# Patient Record
Sex: FEMALE | Race: BLACK OR AFRICAN AMERICAN | NOT HISPANIC OR LATINO | Employment: OTHER | URBAN - METROPOLITAN AREA
[De-identification: names, ages, dates, MRNs, and addresses within clinical notes are randomized per-mention and may not be internally consistent; named-entity substitution may affect disease eponyms.]

---

## 2023-08-29 LAB
ALANINE AMINOTRANSFERASE (SGPT) (U/L) IN SER/PLAS: 9 U/L (ref 7–45)
ALBUMIN (G/DL) IN SER/PLAS: 4.2 G/DL (ref 3.4–5)
ALKALINE PHOSPHATASE (U/L) IN SER/PLAS: 40 U/L (ref 33–136)
ANION GAP IN SER/PLAS: 14 MMOL/L (ref 10–20)
ASPARTATE AMINOTRANSFERASE (SGOT) (U/L) IN SER/PLAS: 17 U/L (ref 9–39)
BILIRUBIN TOTAL (MG/DL) IN SER/PLAS: 0.8 MG/DL (ref 0–1.2)
CALCIUM (MG/DL) IN SER/PLAS: 10.8 MG/DL (ref 8.6–10.6)
CARBON DIOXIDE, TOTAL (MMOL/L) IN SER/PLAS: 30 MMOL/L (ref 21–32)
CHLORIDE (MMOL/L) IN SER/PLAS: 89 MMOL/L (ref 98–107)
CREATININE (MG/DL) IN SER/PLAS: 1.11 MG/DL (ref 0.5–1.05)
ERYTHROCYTE DISTRIBUTION WIDTH (RATIO) BY AUTOMATED COUNT: 14.6 % (ref 11.5–14.5)
ERYTHROCYTE MEAN CORPUSCULAR HEMOGLOBIN CONCENTRATION (G/DL) BY AUTOMATED: 34.4 G/DL (ref 32–36)
ERYTHROCYTE MEAN CORPUSCULAR VOLUME (FL) BY AUTOMATED COUNT: 92 FL (ref 80–100)
ERYTHROCYTES (10*6/UL) IN BLOOD BY AUTOMATED COUNT: 4.18 X10E12/L (ref 4–5.2)
GFR FEMALE: 48 ML/MIN/1.73M2
GLUCOSE (MG/DL) IN SER/PLAS: 118 MG/DL (ref 74–99)
HEMATOCRIT (%) IN BLOOD BY AUTOMATED COUNT: 38.4 % (ref 36–46)
HEMOGLOBIN (G/DL) IN BLOOD: 13.2 G/DL (ref 12–16)
LEUKOCYTES (10*3/UL) IN BLOOD BY AUTOMATED COUNT: 2.8 X10E9/L (ref 4.4–11.3)
NRBC (PER 100 WBCS) BY AUTOMATED COUNT: 0 /100 WBC (ref 0–0)
PLATELETS (10*3/UL) IN BLOOD AUTOMATED COUNT: 256 X10E9/L (ref 150–450)
POTASSIUM (MMOL/L) IN SER/PLAS: 3.2 MMOL/L (ref 3.5–5.3)
PROTEIN TOTAL: 7.5 G/DL (ref 6.4–8.2)
SODIUM (MMOL/L) IN SER/PLAS: 130 MMOL/L (ref 136–145)
THYROTROPIN (MIU/L) IN SER/PLAS BY DETECTION LIMIT <= 0.05 MIU/L: 93.46 MIU/L (ref 0.44–3.98)
THYROXINE (T4) FREE (NG/DL) IN SER/PLAS: 0.87 NG/DL (ref 0.78–1.48)
UREA NITROGEN (MG/DL) IN SER/PLAS: 11 MG/DL (ref 6–23)

## 2023-09-17 PROBLEM — M89.319 CLAVICULAR ENLARGEMENT: Status: ACTIVE | Noted: 2023-09-17

## 2023-09-17 PROBLEM — E63.9 IMPAIRED NUTRITION: Status: ACTIVE | Noted: 2023-09-17

## 2023-09-17 PROBLEM — N81.10 FEMALE BLADDER PROLAPSE: Status: ACTIVE | Noted: 2023-09-17

## 2023-09-17 PROBLEM — E03.9 HYPOTHYROIDISM: Status: ACTIVE | Noted: 2023-09-17

## 2023-09-17 PROBLEM — H52.00 HYPEROPIA WITH ASTIGMATISM AND PRESBYOPIA: Status: ACTIVE | Noted: 2023-09-17

## 2023-09-17 PROBLEM — H52.209 HYPEROPIA WITH ASTIGMATISM AND PRESBYOPIA: Status: ACTIVE | Noted: 2023-09-17

## 2023-09-17 PROBLEM — H30.20 PARS PLANITIS: Status: ACTIVE | Noted: 2023-09-17

## 2023-09-17 PROBLEM — H25.13 CATARACT, NUCLEAR SCLEROTIC, BOTH EYES: Status: ACTIVE | Noted: 2023-09-17

## 2023-09-17 PROBLEM — F32.A DEPRESSION: Status: ACTIVE | Noted: 2023-09-17

## 2023-09-17 PROBLEM — R01.1 HEART MURMUR: Status: ACTIVE | Noted: 2023-09-17

## 2023-09-17 PROBLEM — I10 HYPERTENSION: Status: ACTIVE | Noted: 2023-09-17

## 2023-09-17 PROBLEM — H35.063 RETINAL VASCULITIS, BILATERAL: Status: ACTIVE | Noted: 2023-09-17

## 2023-09-17 PROBLEM — F32.5 MAJOR DEPRESSIVE DISORDER WITH SINGLE EPISODE, IN FULL REMISSION (CMS-HCC): Status: ACTIVE | Noted: 2023-09-17

## 2023-09-17 PROBLEM — R35.0 FREQUENT URINATION: Status: ACTIVE | Noted: 2023-09-17

## 2023-09-17 PROBLEM — R79.9 ABNORMAL BLOOD CHEMISTRY: Status: ACTIVE | Noted: 2023-09-17

## 2023-09-17 PROBLEM — H52.4 HYPEROPIA WITH ASTIGMATISM AND PRESBYOPIA: Status: ACTIVE | Noted: 2023-09-17

## 2023-09-17 PROBLEM — H20.9 ANTERIOR UVEITIS: Status: ACTIVE | Noted: 2023-09-17

## 2023-09-17 PROBLEM — R63.4 WEIGHT LOSS: Status: ACTIVE | Noted: 2023-09-17

## 2023-09-17 PROBLEM — R79.89 LOW VITAMIN D LEVEL: Status: ACTIVE | Noted: 2023-09-17

## 2023-09-17 RX ORDER — HYDROCHLOROTHIAZIDE 25 MG/1
1 TABLET ORAL DAILY
COMMUNITY
Start: 2013-04-03 | End: 2023-10-18 | Stop reason: ALTCHOICE

## 2023-09-17 RX ORDER — VIT C/E/ZN/COPPR/LUTEIN/ZEAXAN 250MG-90MG
1 CAPSULE ORAL DAILY
COMMUNITY
Start: 2017-10-03 | End: 2023-10-18 | Stop reason: ALTCHOICE

## 2023-09-17 RX ORDER — PREDNISOLONE ACETATE 10 MG/ML
1 SUSPENSION/ DROPS OPHTHALMIC SEE ADMIN INSTRUCTIONS
COMMUNITY
Start: 2021-06-15

## 2023-09-17 RX ORDER — LEVOTHYROXINE SODIUM 75 UG/1
1 TABLET ORAL DAILY
COMMUNITY
Start: 2013-05-20 | End: 2023-10-20 | Stop reason: ALTCHOICE

## 2023-09-17 RX ORDER — LEVOTHYROXINE SODIUM 50 UG/1
1 CAPSULE ORAL DAILY
COMMUNITY
End: 2023-10-18

## 2023-09-17 RX ORDER — MIRTAZAPINE 15 MG/1
1 TABLET, FILM COATED ORAL NIGHTLY
COMMUNITY
Start: 2013-04-02 | End: 2023-10-18 | Stop reason: ALTCHOICE

## 2023-09-17 RX ORDER — CLONAZEPAM 0.5 MG/1
1 TABLET ORAL NIGHTLY
COMMUNITY
Start: 2016-05-05 | End: 2023-11-07 | Stop reason: SDUPTHER

## 2023-10-18 ENCOUNTER — APPOINTMENT (OUTPATIENT)
Dept: OPHTHALMOLOGY | Facility: CLINIC | Age: 88
End: 2023-10-18
Payer: MEDICAID

## 2023-10-18 ENCOUNTER — OFFICE VISIT (OUTPATIENT)
Dept: PRIMARY CARE | Facility: CLINIC | Age: 88
End: 2023-10-18
Payer: MEDICARE

## 2023-10-18 VITALS
HEART RATE: 81 BPM | RESPIRATION RATE: 16 BRPM | WEIGHT: 98 LBS | BODY MASS INDEX: 17.36 KG/M2 | SYSTOLIC BLOOD PRESSURE: 129 MMHG | HEIGHT: 63 IN | DIASTOLIC BLOOD PRESSURE: 63 MMHG | TEMPERATURE: 97.6 F | OXYGEN SATURATION: 99 %

## 2023-10-18 DIAGNOSIS — I10 PRIMARY HYPERTENSION: Primary | ICD-10-CM

## 2023-10-18 DIAGNOSIS — E03.9 HYPOTHYROIDISM, UNSPECIFIED TYPE: ICD-10-CM

## 2023-10-18 DIAGNOSIS — F34.1 PERSISTENT DEPRESSIVE DISORDER: ICD-10-CM

## 2023-10-18 DIAGNOSIS — R63.4 WEIGHT LOSS: ICD-10-CM

## 2023-10-18 LAB
ANION GAP SERPL CALC-SCNC: 14 MMOL/L (ref 10–20)
BUN SERPL-MCNC: 9 MG/DL (ref 6–23)
CALCIUM SERPL-MCNC: 10.3 MG/DL (ref 8.6–10.6)
CHLORIDE SERPL-SCNC: 95 MMOL/L (ref 98–107)
CO2 SERPL-SCNC: 31 MMOL/L (ref 21–32)
CREAT SERPL-MCNC: 0.89 MG/DL (ref 0.5–1.05)
GFR SERPL CREATININE-BSD FRML MDRD: 63 ML/MIN/1.73M*2
GLUCOSE SERPL-MCNC: 95 MG/DL (ref 74–99)
POTASSIUM SERPL-SCNC: 3.7 MMOL/L (ref 3.5–5.3)
SODIUM SERPL-SCNC: 136 MMOL/L (ref 136–145)
T4 FREE SERPL-MCNC: 0.86 NG/DL (ref 0.78–1.48)
TSH SERPL-ACNC: 26.18 MIU/L (ref 0.44–3.98)

## 2023-10-18 PROCEDURE — 1126F AMNT PAIN NOTED NONE PRSNT: CPT | Performed by: INTERNAL MEDICINE

## 2023-10-18 PROCEDURE — 3078F DIAST BP <80 MM HG: CPT | Performed by: INTERNAL MEDICINE

## 2023-10-18 PROCEDURE — 3074F SYST BP LT 130 MM HG: CPT | Performed by: INTERNAL MEDICINE

## 2023-10-18 PROCEDURE — 36415 COLL VENOUS BLD VENIPUNCTURE: CPT | Mod: PO | Performed by: INTERNAL MEDICINE

## 2023-10-18 PROCEDURE — 99215 OFFICE O/P EST HI 40 MIN: CPT | Performed by: INTERNAL MEDICINE

## 2023-10-18 PROCEDURE — 84443 ASSAY THYROID STIM HORMONE: CPT | Performed by: INTERNAL MEDICINE

## 2023-10-18 PROCEDURE — 99215 OFFICE O/P EST HI 40 MIN: CPT | Mod: PO | Performed by: INTERNAL MEDICINE

## 2023-10-18 PROCEDURE — 80048 BASIC METABOLIC PNL TOTAL CA: CPT | Performed by: INTERNAL MEDICINE

## 2023-10-18 PROCEDURE — 84439 ASSAY OF FREE THYROXINE: CPT | Performed by: INTERNAL MEDICINE

## 2023-10-18 RX ORDER — MIRTAZAPINE 30 MG/1
30 TABLET, FILM COATED ORAL NIGHTLY
Qty: 90 TABLET | Refills: 1 | Status: SHIPPED | OUTPATIENT
Start: 2023-10-18 | End: 2023-11-07 | Stop reason: SDUPTHER

## 2023-10-18 ASSESSMENT — ENCOUNTER SYMPTOMS
FEVER: 0
CHILLS: 0
SHORTNESS OF BREATH: 0
VOMITING: 0
ABDOMINAL PAIN: 0
NAUSEA: 0

## 2023-10-18 ASSESSMENT — PAIN SCALES - GENERAL: PAINLEVEL: 0-NO PAIN

## 2023-10-18 NOTE — PROGRESS NOTES
"Subjective   Patient ID: Shira Curry is a 88 y.o. female who presents for Annual Exam.    Presents for complete physical exam. Was seeing a doctor at [facility] and subsequently had  1. Hydrochlorothiazide discontinued  2. Started on potassium (not sure for how long)  3. Dose of Remeron increased from 15 to 30  4. Dose of Synthroid increased from 75 to 100  Pt however, continued with the hydrochlorothiazide and is not sure how much of the increased Synthroid she took but went back to her original dose. Weight is up and she reports she is eating better.          Review of Systems   Constitutional:  Negative for chills and fever.   Respiratory:  Negative for shortness of breath.    Cardiovascular:  Negative for chest pain.   Gastrointestinal:  Negative for abdominal pain, nausea and vomiting.       Objective   /63 (BP Location: Left arm, Patient Position: Sitting, BP Cuff Size: Adult)   Pulse 81   Temp 36.4 °C (97.6 °F) (Temporal)   Resp 16   Ht 1.6 m (5' 3\")   Wt (!) 44.5 kg (98 lb)   SpO2 99%   BMI 17.36 kg/m²     Physical Exam  Gen:well groomed in NAD  A & O: alert and oriented x 3  HEENT: PEERL, EOMI, TM's clear bilaterally, no carotid bruits, no palpable thyroid nodules  CV: RRR  Lungs: CTA bilaterally  Abd: soft, NT/ND  Extr: no edema  Breasts: no palpable nodules or nipple discharge  Neuro: CN 2-12 intact, motor 5/5, sensation intact  Pelvic: deferred to Gyn   Assessment/Plan   Hypothyroidism: recheck labs.  High blood pressure: will discontinue med and the potassium if she is still taking it. Recheck labs.  Declines flu shot  Return to office 6 months or sooner depending on lab results. Will call daughter with instructions.        "

## 2023-10-20 DIAGNOSIS — E03.9 HYPOTHYROIDISM, UNSPECIFIED TYPE: Primary | ICD-10-CM

## 2023-10-20 RX ORDER — LEVOTHYROXINE SODIUM 100 UG/1
100 TABLET ORAL DAILY
Qty: 90 TABLET | Refills: 1 | Status: SHIPPED | OUTPATIENT
Start: 2023-10-20 | End: 2024-10-19

## 2023-10-20 NOTE — PROGRESS NOTES
Spoke with the patients daughter. Thyroid med should now be 100mg daily. Daughter will  the script. Needs repeat blood draw in one month

## 2023-11-07 ENCOUNTER — OFFICE VISIT (OUTPATIENT)
Dept: BEHAVIORAL HEALTH | Facility: CLINIC | Age: 88
End: 2023-11-07
Payer: MEDICAID

## 2023-11-07 VITALS
WEIGHT: 98.1 LBS | BODY MASS INDEX: 15.4 KG/M2 | HEIGHT: 67 IN | SYSTOLIC BLOOD PRESSURE: 166 MMHG | DIASTOLIC BLOOD PRESSURE: 94 MMHG | TEMPERATURE: 97.4 F | HEART RATE: 83 BPM

## 2023-11-07 DIAGNOSIS — F34.1 PERSISTENT DEPRESSIVE DISORDER: ICD-10-CM

## 2023-11-07 PROCEDURE — 1126F AMNT PAIN NOTED NONE PRSNT: CPT | Performed by: PSYCHIATRY & NEUROLOGY

## 2023-11-07 PROCEDURE — 3077F SYST BP >= 140 MM HG: CPT | Performed by: PSYCHIATRY & NEUROLOGY

## 2023-11-07 PROCEDURE — 1036F TOBACCO NON-USER: CPT | Performed by: PSYCHIATRY & NEUROLOGY

## 2023-11-07 PROCEDURE — 99214 OFFICE O/P EST MOD 30 MIN: CPT | Performed by: PSYCHIATRY & NEUROLOGY

## 2023-11-07 PROCEDURE — 3080F DIAST BP >= 90 MM HG: CPT | Performed by: PSYCHIATRY & NEUROLOGY

## 2023-11-07 RX ORDER — CLONAZEPAM 0.5 MG/1
0.5 TABLET ORAL NIGHTLY
Qty: 30 TABLET | Refills: 2 | Status: SHIPPED | OUTPATIENT
Start: 2023-11-07 | End: 2024-02-06 | Stop reason: SDUPTHER

## 2023-11-07 RX ORDER — MIRTAZAPINE 30 MG/1
30 TABLET, FILM COATED ORAL NIGHTLY
Qty: 30 TABLET | Refills: 3 | Status: SHIPPED | OUTPATIENT
Start: 2023-11-07 | End: 2024-02-06 | Stop reason: SDUPTHER

## 2023-11-07 NOTE — PATIENT INSTRUCTIONS
Plan:   - Continue mirtazapine 30mg at bedtime.   - Continue clonazepam 0.5mg at bedtime. - Make sure that you don't get this prescription from anyone else going forward.   - Recommend using a pillbox for your medications.   - Please talk to your primary care doctor about your high blood pressure today.   - Healthy lifestyle encouraged - regular physical and mental exercise; stay socially engaged; maintain good sleep hygiene; eat heart-healthy diet.   - Follow up in about 3 months.   - Call sooner in case of any questions.

## 2023-11-07 NOTE — PROGRESS NOTES
"Outpatient Psychiatry      Reason for Visit:   Follow up for depression    Subjective   Ms. Shira Curry is an 88-year old woman with a history of depression, hypothyroidism, HTN, IBS. Follow up done in person. Daughter was present for part of the visit.      She says she feels \"pretty good most of the time.\"   She says she sometimes feels like she does not want to do anything.   She says she sleeps well but has to take mirtazapine and clonazepam at bedtime.      She saw a doctor at [facility] and mirtazapine dose was increased to 30mg at bedtime. However, patient and her daughter note that she will no longer be seeing the providers through [facility] because she did not like the setup. Daughter notes that she is doing well on the current dose, while she was somewhat sluggish before.     She says she sometimes goes to activities downstairs in her building. She says the activities are not as much as before due to COVID.      Appetite is ok; weight has been stable. She says her diet is limited because of IBS.   No death wishes, suicidal thoughts, intent or plans.   No HI.   Denies AVH, paranoia or delusions.      She denies panic attacks.      She says her memory is \"okay.\" She says she sometimes forgets things.   She says she is still independent with ADLs and IADLs.   She does not drive.   She manages her own bills and denies any problems. Her daughter may help.   She does not cook much; denies any issues in the kitchen. Her daughter may bring her food.   She is able to walk to the store.   She does some cleaning in the house.   Takes medications on her own; does not lupillo pillbox.      She lives in a senior building.   Has six daughters; usually in contact with 3 of them; one daughter is out of town in [place] but calls frequently.      Uses a cane to walk.     Current medications:   Mirtazapine 30mg at bedtime  Clonazepam 0.5mg at bedtime was not on the list she provided but she notes that she does take it at night. On " OARRS, it has been prescribed for the last 6 prescriptions by Harleen Canales NP, last filled on 10/18/23.        Current Medications:    Current Outpatient Medications:     clonazePAM (KlonoPIN) 0.5 mg tablet, Take 1 tablet (0.5 mg) by mouth once daily at bedtime., Disp: , Rfl:     francy.stocking,thigh,reg,med misc, 1 Units see administration instructions., Disp: , Rfl:     levothyroxine (Synthroid, Levoxyl) 100 mcg tablet, Take 1 tablet (100 mcg) by mouth once daily., Disp: 90 tablet, Rfl: 1    mirtazapine (Remeron) 30 mg tablet, Take 1 tablet (30 mg) by mouth once daily at bedtime., Disp: 90 tablet, Rfl: 1    prednisoLONE acetate (Pred Forte) 1 % ophthalmic suspension, Administer 1 drop into affected eye(s) see administration instructions., Disp: , Rfl:   Medical History:  Past Medical History:   Diagnosis Date    Pain in unspecified hip 06/15/2021    Hip pain     Surgical History:  Past Surgical History:   Procedure Laterality Date    COLONOSCOPY  12/30/2015    Complete Colonoscopy     Family History:  Family History   Problem Relation Name Age of Onset    Hypertension Mother      Breast cancer Mother       Social History:  Social History     Socioeconomic History    Marital status:      Spouse name: Not on file    Number of children: Not on file    Years of education: Not on file    Highest education level: Not on file   Occupational History    Not on file   Tobacco Use    Smoking status: Not on file    Smokeless tobacco: Not on file   Substance and Sexual Activity    Alcohol use: Not on file    Drug use: Not on file    Sexual activity: Not on file   Other Topics Concern    Not on file   Social History Narrative    Not on file     Social Determinants of Health     Financial Resource Strain: Not on file   Food Insecurity: Not on file   Transportation Needs: Not on file   Physical Activity: Not on file   Stress: Not on file   Social Connections: Not on file   Intimate Partner Violence: Not on file  "  Housing Stability: Not on file         Record Review: brief         Psychiatric Review Of Systems:  As above.      Medical Review Of Systems:  Pertinent items are noted in HPI.      Objective   Mental Status Exam:   Appearance: appeared to be stated age; fair hygiene and grooming.   Behavior/Attitude: Cooperative. Pleasant.   Speech: Regular in rate, soft tone, and regular volume. No pressure.  Mood: \"pretty good most of the time\"  Thought process: Goal-directed. Linear. Organized.  Thought content: No paranoia, delusion or ideas of reference elicited. No hallucinations in auditory, visual or other sensory modalities.   Suicidal ideation: denied.  Homicidal ideation: denied.   Insight: Fair.  Judgment: Fair.  Recent and remote memory: fair recall of recent and remote autobiographical memories. Daughter had to help with some details regarding her recent contact with [facility]. Patient knew her medication regimen.   Attention/concentration: intact during visit  Language: No aphasia or paraphasic errors during conversation   Fund of knowledge: Average      Vitals:  Vitals:    11/07/23 1008   BP: (!) 166/94   Pulse: 83   Temp: 36.3 °C (97.4 °F)       Assessment/Plan   Diagnosis:   Major depressive disorder, in full remission    Assessment:     Ms. Shira Curry is a 88-year old woman with a history of depression, hypothyroidism, HTN, IBS. Follow up done in person in office today.      11/7/23: Mood continues to be stable.   Stable cognitively and functionally; has some help from family but otherwise functioning independently.   Her mirtazapine was increased by provider through [facility]. Seems to be tolerating it well.      Treatment Plan/Recommendations:    Plan:   - Continue remeron 30mg at bedtime and clonazepam 0.5mg at bedtime. (She has tried lowering clonazepam but could not tolerate.)  - I have personally reviewed the OARRS report for this patient. The report is scanned into the electronic medical record. I have considered " the risks of abuse, dependence, addiction and diversion. I believe that it is clinically appropriate for the patient to be prescribed this medication.  - She signed benzodiazepine controlled substance agreement e-document. Discussed the details of the agreement, especially not getting it from other prescribers.   - Encouraged healthy lifestyle.   - Follow up in 3 months.       Review with patient: Treatment plan reviewed with the patient.  Medication risks/benefit reviewed with the patient      Elizabeth Guerrero MD

## 2023-11-08 ENCOUNTER — TELEPHONE (OUTPATIENT)
Dept: PRIMARY CARE | Facility: CLINIC | Age: 88
End: 2023-11-08
Payer: MEDICARE

## 2023-11-18 ENCOUNTER — TELEPHONE (OUTPATIENT)
Dept: BEHAVIORAL HEALTH | Facility: CLINIC | Age: 88
End: 2023-11-18
Payer: MEDICARE

## 2023-11-18 NOTE — TELEPHONE ENCOUNTER
"This patient called the psych answering service on 11/18 at 09:18. She reports that she has run out of her clonazepam and mirtazapine. These medications were delivered by her pharmacy \"in a box.\" Last night was her last dose of meds, and today, she can't find any more.     From chart review, this is an 89 yo with a history of persistent depressive disorder who sees Dr. Guerrero. Per his last note on 11/07/2023 the plan was for the patient to continue mirtazapine 30 mg at bedtime and clonazepam 0.5 mg at bedtime. A review of her current orders shows that she has refills on both medications. Per OARRS, there have been no fills for clonazepam since 10/18.     I explain to the patient that she has refills available at her pharmacy and she can call to have her meds refilled. I give her the phone number for the pharmacy. She asks several times what she should do, and we review the plan for her to get refills through her pharmacy. She reads the number back to me three times and agrees to call the psychiatry service if she needs any help. I ask if there is a family member available to help her and she says, \"I'll have to wait and see.\" She goes over the plan with me once more and we end the call.    I try calling her back a few times in the afternoon, to check on her, but her line is busy each time.     I will add Dr. Guerrero to this note.   "

## 2023-12-18 DIAGNOSIS — I10 PRIMARY HYPERTENSION: Primary | ICD-10-CM

## 2023-12-18 RX ORDER — HYDROCHLOROTHIAZIDE 25 MG/1
25 TABLET ORAL DAILY
Qty: 90 TABLET | Refills: 1 | Status: SHIPPED | OUTPATIENT
Start: 2023-12-18

## 2024-01-12 ENCOUNTER — TELEPHONE (OUTPATIENT)
Dept: BEHAVIORAL HEALTH | Facility: CLINIC | Age: 89
End: 2024-01-12
Payer: MEDICARE

## 2024-02-06 ENCOUNTER — OFFICE VISIT (OUTPATIENT)
Dept: BEHAVIORAL HEALTH | Facility: CLINIC | Age: 89
End: 2024-02-06
Payer: MEDICARE

## 2024-02-06 VITALS
SYSTOLIC BLOOD PRESSURE: 163 MMHG | BODY MASS INDEX: 15.91 KG/M2 | TEMPERATURE: 97.2 F | DIASTOLIC BLOOD PRESSURE: 87 MMHG | HEIGHT: 66 IN | WEIGHT: 99 LBS | HEART RATE: 78 BPM

## 2024-02-06 DIAGNOSIS — F34.1 PERSISTENT DEPRESSIVE DISORDER: ICD-10-CM

## 2024-02-06 PROCEDURE — 99213 OFFICE O/P EST LOW 20 MIN: CPT | Performed by: PSYCHIATRY & NEUROLOGY

## 2024-02-06 PROCEDURE — 3079F DIAST BP 80-89 MM HG: CPT | Performed by: PSYCHIATRY & NEUROLOGY

## 2024-02-06 PROCEDURE — 3077F SYST BP >= 140 MM HG: CPT | Performed by: PSYCHIATRY & NEUROLOGY

## 2024-02-06 PROCEDURE — 1036F TOBACCO NON-USER: CPT | Performed by: PSYCHIATRY & NEUROLOGY

## 2024-02-06 PROCEDURE — 1126F AMNT PAIN NOTED NONE PRSNT: CPT | Performed by: PSYCHIATRY & NEUROLOGY

## 2024-02-06 RX ORDER — CLONAZEPAM 0.5 MG/1
0.5 TABLET ORAL NIGHTLY
Qty: 30 TABLET | Refills: 2 | Status: SHIPPED | OUTPATIENT
Start: 2024-02-06 | End: 2024-05-06 | Stop reason: SDUPTHER

## 2024-02-06 RX ORDER — MIRTAZAPINE 30 MG/1
30 TABLET, FILM COATED ORAL NIGHTLY
Qty: 30 TABLET | Refills: 3 | Status: SHIPPED | OUTPATIENT
Start: 2024-02-06 | End: 2024-08-04

## 2024-02-06 NOTE — PATIENT INSTRUCTIONS
Plan:   - Continue remeron 30mg at bedtime.   - Continue clonazepam 0.5mg at bedtime.   - Please follow up with primary care doctor about your blood pressure.   - Encouraged healthy lifestyle.   - Follow up in about 3 months.   - Contact via [employer] or call sooner ([phone]) in case of any questions or concerns.  - Call [phone] for mental health crisis or suicidal thoughts, or call 911 or go to the nearest emergency room for emergencies.    Brain healthy lifestyle:   - Make sure your medical conditions (if any) such as diabetes, high blood pressure, high cholesterol, thyroid disease sleep apnea are optimally controlled.   - Use eyeglasses or hearing aids appropriately if needed.   - Eat a heart healthy diet (like a Mediterranean diet; lots of fruits and vegetables, fish; low fat;)  - Exercise regularly as tolerated.   - Maintain good sleep hygiene; avoid daytime naps; try to get 7 to 8 hours of continuous sleep at night.   - Stay mentally active - puzzles, word searches, books, playing cards.  - Stay socially active and engaged.

## 2024-02-06 NOTE — PROGRESS NOTES
"Outpatient Psychiatry      Reason for Visit:   Follow up for depression    Subjective   Ms. Shira Curry is an 88-year old woman with a history of depression, hypothyroidism, HTN, IBS. Follow up done in person. Daughter was present for part of the visit.      She says she feels \"good.\"     She says she sleeps well; she says she still has to take mirtazapine and clonazepam at bedtime to help her sleep.   Appetite is alright but notes she does not eat a lot; says weight is stable.   She says her diet is limited because of IBS.   No death wishes, suicidal thoughts, intent or plans.   No HI.     Denies AVH, paranoia or delusions.     She denies panic attacks.      She says her memory is \"okay\" but she sometimes forgets things.   She says she is still independent with ADLs and IADLs.   She does not drive.   She manages her own bills and denies any problems. Her daughter may help somewhat.  She does not cook much; denies any issues in the kitchen. Her daughter may bring her food.   She is able to walk to the store.   She does some cleaning in the house.   Takes medications on her own; does not use pillbox.   She says her daughters may help remind her of appointments.     She says she did not take her BP medication today - her BP was slightly high today. Denies any headaches, dizziness, chest pain. She says the paperwork from her primary care doctor at last visit noted she should stop her BP medication but notes that she has continued to take it.      She lives in a senior building.   Has six daughters; usually in contact with most of them; one daughter is out of town in [place] but calls frequently.     She says she sometimes goes to activities in her building. She says the activities are not as much as before due to COVID. She also goes to the store.      Uses a cane to walk.      Current medications:   Mirtazapine 30mg at bedtime  Clonazepam 0.5mg at bedtime.      Current Medications:    Current Outpatient Medications: "     clonazePAM (KlonoPIN) 0.5 mg tablet, Take 1 tablet (0.5 mg) by mouth once daily at bedtime., Disp: 30 tablet, Rfl: 2    francy.stocking,thigh,reg,med misc, 1 Units see administration instructions., Disp: , Rfl:     hydroCHLOROthiazide (HYDRODiuril) 25 mg tablet, TAKE 1 TABLET BY MOUTH EVERY DAY, Disp: 90 tablet, Rfl: 1    levothyroxine (Synthroid, Levoxyl) 100 mcg tablet, Take 1 tablet (100 mcg) by mouth once daily., Disp: 90 tablet, Rfl: 1    mirtazapine (Remeron) 30 mg tablet, Take 1 tablet (30 mg) by mouth once daily at bedtime., Disp: 30 tablet, Rfl: 3    prednisoLONE acetate (Pred Forte) 1 % ophthalmic suspension, Administer 1 drop into affected eye(s) see administration instructions., Disp: , Rfl:   Medical History:  Past Medical History:   Diagnosis Date    Pain in unspecified hip 06/15/2021    Hip pain     Surgical History:  Past Surgical History:   Procedure Laterality Date    COLONOSCOPY  12/30/2015    Complete Colonoscopy     Family History:  Family History   Problem Relation Name Age of Onset    Hypertension Mother      Breast cancer Mother       Social History:  Social History     Socioeconomic History    Marital status:      Spouse name: Not on file    Number of children: Not on file    Years of education: Not on file    Highest education level: Not on file   Occupational History    Not on file   Tobacco Use    Smoking status: Never    Smokeless tobacco: Never   Substance and Sexual Activity    Alcohol use: Not on file    Drug use: Not on file    Sexual activity: Not on file   Other Topics Concern    Not on file   Social History Narrative    Not on file     Social Determinants of Health     Financial Resource Strain: Not on file   Food Insecurity: Not on file   Transportation Needs: Not on file   Physical Activity: Not on file   Stress: Not on file   Social Connections: Not on file   Intimate Partner Violence: Not on file   Housing Stability: Not on file         Record Review: brief    "    Psychiatric Review Of Systems:  As above.      Medical Review Of Systems:  Pertinent items are noted in HPI.      Objective   Mental Status Exam:   Appearance: appeared to be stated age; fair hygiene and grooming.   Behavior/Attitude: Cooperative. Pleasant.   Gait: slow; uses a cane.  Speech: Regular in rate, soft tone, and regular volume. No pressure.  Mood: \"good.\"  Thought process: Goal-directed. Linear. Organized.  Thought content: No paranoia, delusion or ideas of reference elicited. No hallucinations in auditory, visual or other sensory modalities.   Suicidal ideation: denied.  Homicidal ideation: denied.   Insight: Fair.  Judgment: Fair.  Recent and remote memory: fair recall of recent and remote autobiographical memories.   Attention/concentration: intact during visit  Language: No aphasia or paraphasic errors during conversation   Fund of knowledge: Average    Orientation: oriented to day, month, year but off by one day on date. Oriented to place, floor, city, state.   Knew name of current president and his predecessor. Knew name of vice-president.   Current events: \"war in [place]...\" \"COVID.\"     Vitals:  There were no vitals filed for this visit.      Assessment/Plan   Diagnosis:   Major depressive disorder, in full remission    Assessment:   Ms. Shira Curry is a 88-year old woman with a history of depression, hypothyroidism, HTN, IBS. Follow up done in person in office today.      2/6/24: Mood continues to be stable.   Stable cognitively and functionally; has some help from family but otherwise functioning independently.     Treatment Plan/Recommendations:    Plan:   - Continue remeron 30mg at bedtime.   - Continue clonazepam 0.5mg at bedtime. (She has tried lowering clonazepam but could not tolerate.)  - I have personally reviewed the OARRS report for this patient. The report is scanned into the electronic medical record. I have considered the risks of abuse, dependence, addiction and diversion. I " believe that it is clinically appropriate for the patient to be prescribed this medication.  - Advised to follow up with primary care doctor about blood pressure.   - Encouraged healthy lifestyle.   - Follow up in about 3 months.       Review with patient: Treatment plan reviewed with the patient.  Medication risks/benefit reviewed with the patient      Elizabeth Guerrero MD

## 2024-04-17 ENCOUNTER — APPOINTMENT (OUTPATIENT)
Dept: PRIMARY CARE | Facility: CLINIC | Age: 89
End: 2024-04-17
Payer: MEDICARE

## 2024-04-17 ENCOUNTER — OFFICE VISIT (OUTPATIENT)
Dept: PRIMARY CARE | Facility: CLINIC | Age: 89
End: 2024-04-17
Payer: MEDICARE

## 2024-04-17 VITALS
DIASTOLIC BLOOD PRESSURE: 83 MMHG | RESPIRATION RATE: 16 BRPM | SYSTOLIC BLOOD PRESSURE: 141 MMHG | OXYGEN SATURATION: 98 % | HEART RATE: 81 BPM | WEIGHT: 99 LBS | BODY MASS INDEX: 15.98 KG/M2

## 2024-04-17 DIAGNOSIS — E03.9 HYPOTHYROIDISM, UNSPECIFIED TYPE: ICD-10-CM

## 2024-04-17 DIAGNOSIS — I10 PRIMARY HYPERTENSION: Primary | ICD-10-CM

## 2024-04-17 PROCEDURE — 99213 OFFICE O/P EST LOW 20 MIN: CPT | Performed by: INTERNAL MEDICINE

## 2024-04-17 PROCEDURE — 3079F DIAST BP 80-89 MM HG: CPT | Performed by: INTERNAL MEDICINE

## 2024-04-17 PROCEDURE — 1126F AMNT PAIN NOTED NONE PRSNT: CPT | Performed by: INTERNAL MEDICINE

## 2024-04-17 PROCEDURE — 1159F MED LIST DOCD IN RCRD: CPT | Performed by: INTERNAL MEDICINE

## 2024-04-17 PROCEDURE — 1036F TOBACCO NON-USER: CPT | Performed by: INTERNAL MEDICINE

## 2024-04-17 PROCEDURE — 3077F SYST BP >= 140 MM HG: CPT | Performed by: INTERNAL MEDICINE

## 2024-04-17 ASSESSMENT — ENCOUNTER SYMPTOMS
VOMITING: 0
CHILLS: 0
LOSS OF SENSATION IN FEET: 0
NAUSEA: 0
SHORTNESS OF BREATH: 0
DEPRESSION: 0
OCCASIONAL FEELINGS OF UNSTEADINESS: 0
ABDOMINAL PAIN: 0
FEVER: 0

## 2024-04-17 ASSESSMENT — PATIENT HEALTH QUESTIONNAIRE - PHQ9
2. FEELING DOWN, DEPRESSED OR HOPELESS: NOT AT ALL
SUM OF ALL RESPONSES TO PHQ9 QUESTIONS 1 AND 2: 0
1. LITTLE INTEREST OR PLEASURE IN DOING THINGS: NOT AT ALL

## 2024-04-17 ASSESSMENT — PAIN SCALES - GENERAL: PAINLEVEL: 0-NO PAIN

## 2024-04-17 NOTE — PROGRESS NOTES
Subjective   Patient ID: Shira Curry is a 88 y.o. female who presents for Follow-up.    Accompanied by daughter. No acute issues. Patient doing well. [facility] in Feb and has upcoming appt.          Review of Systems   Constitutional:  Negative for chills and fever.   Respiratory:  Negative for shortness of breath.    Cardiovascular:  Negative for chest pain.   Gastrointestinal:  Negative for abdominal pain, nausea and vomiting.       Objective   /83   Pulse 81   Resp 16   Wt (!) 44.9 kg (99 lb)   SpO2 98%   BMI 15.98 kg/m²     Physical Exam  Gen appearance: well groomed in NAD  A & O: alert and oriented x 3  CV: RRR   Lungs: CTA bilaterally  Extr: no edema   Assessment/Plan   HTN: cont meds.   2.   Hypothyr: cont meds  3.   Depression: stable  4.    RTO Oct CPE.

## 2024-05-06 ENCOUNTER — TELEPHONE (OUTPATIENT)
Dept: BEHAVIORAL HEALTH | Facility: CLINIC | Age: 89
End: 2024-05-06
Payer: MEDICARE

## 2024-05-06 DIAGNOSIS — F34.1 PERSISTENT DEPRESSIVE DISORDER: ICD-10-CM

## 2024-05-06 RX ORDER — CLONAZEPAM 0.5 MG/1
0.5 TABLET ORAL NIGHTLY
Qty: 30 TABLET | Refills: 2 | Status: SHIPPED | OUTPATIENT
Start: 2024-05-06

## 2024-06-03 ENCOUNTER — APPOINTMENT (OUTPATIENT)
Dept: RADIOLOGY | Facility: HOSPITAL | Age: 89
End: 2024-06-03
Payer: MEDICARE

## 2024-06-03 LAB
ALBUMIN SERPL BCP-MCNC: 3.8 G/DL (ref 3.4–5)
ALP SERPL-CCNC: 67 U/L (ref 33–136)
ALT SERPL W P-5'-P-CCNC: 5 U/L (ref 7–45)
ANION GAP SERPL CALC-SCNC: 10 MMOL/L (ref 10–20)
AST SERPL W P-5'-P-CCNC: 14 U/L (ref 9–39)
BASOPHILS # BLD AUTO: 0.06 X10*3/UL (ref 0–0.1)
BASOPHILS NFR BLD AUTO: 1.1 %
BILIRUB SERPL-MCNC: 0.5 MG/DL (ref 0–1.2)
BUN SERPL-MCNC: 11 MG/DL (ref 6–23)
CALCIUM SERPL-MCNC: 9.8 MG/DL (ref 8.6–10.3)
CARDIAC TROPONIN I PNL SERPL HS: <3 NG/L (ref 0–13)
CHLORIDE SERPL-SCNC: 90 MMOL/L (ref 98–107)
CO2 SERPL-SCNC: 32 MMOL/L (ref 21–32)
CREAT SERPL-MCNC: 0.84 MG/DL (ref 0.5–1.05)
EGFRCR SERPLBLD CKD-EPI 2021: 68 ML/MIN/1.73M*2
EOSINOPHIL # BLD AUTO: 0.16 X10*3/UL (ref 0–0.4)
EOSINOPHIL NFR BLD AUTO: 2.8 %
ERYTHROCYTE [DISTWIDTH] IN BLOOD BY AUTOMATED COUNT: 14.6 % (ref 11.5–14.5)
GLUCOSE SERPL-MCNC: 99 MG/DL (ref 74–99)
HCT VFR BLD AUTO: 36.1 % (ref 36–46)
HGB BLD-MCNC: 12.2 G/DL (ref 12–16)
IMM GRANULOCYTES # BLD AUTO: 0.07 X10*3/UL (ref 0–0.5)
IMM GRANULOCYTES NFR BLD AUTO: 1.2 % (ref 0–0.9)
LYMPHOCYTES # BLD AUTO: 1.27 X10*3/UL (ref 0.8–3)
LYMPHOCYTES NFR BLD AUTO: 22.5 %
MCH RBC QN AUTO: 30 PG (ref 26–34)
MCHC RBC AUTO-ENTMCNC: 33.8 G/DL (ref 32–36)
MCV RBC AUTO: 89 FL (ref 80–100)
MONOCYTES # BLD AUTO: 0.55 X10*3/UL (ref 0.05–0.8)
MONOCYTES NFR BLD AUTO: 9.7 %
NEUTROPHILS # BLD AUTO: 3.54 X10*3/UL (ref 1.6–5.5)
NEUTROPHILS NFR BLD AUTO: 62.7 %
NRBC BLD-RTO: 0 /100 WBCS (ref 0–0)
PLATELET # BLD AUTO: 314 X10*3/UL (ref 150–450)
POTASSIUM SERPL-SCNC: 3.2 MMOL/L (ref 3.5–5.3)
PROT SERPL-MCNC: 7.7 G/DL (ref 6.4–8.2)
RBC # BLD AUTO: 4.06 X10*6/UL (ref 4–5.2)
SODIUM SERPL-SCNC: 129 MMOL/L (ref 136–145)
WBC # BLD AUTO: 5.7 X10*3/UL (ref 4.4–11.3)

## 2024-06-03 PROCEDURE — 84484 ASSAY OF TROPONIN QUANT: CPT | Performed by: NURSE PRACTITIONER

## 2024-06-03 PROCEDURE — 36415 COLL VENOUS BLD VENIPUNCTURE: CPT | Performed by: NURSE PRACTITIONER

## 2024-06-03 PROCEDURE — 73060 X-RAY EXAM OF HUMERUS: CPT | Mod: RIGHT SIDE | Performed by: STUDENT IN AN ORGANIZED HEALTH CARE EDUCATION/TRAINING PROGRAM

## 2024-06-03 PROCEDURE — 73564 X-RAY EXAM KNEE 4 OR MORE: CPT | Mod: LT

## 2024-06-03 PROCEDURE — 73060 X-RAY EXAM OF HUMERUS: CPT | Mod: LT

## 2024-06-03 PROCEDURE — 72170 X-RAY EXAM OF PELVIS: CPT | Performed by: STUDENT IN AN ORGANIZED HEALTH CARE EDUCATION/TRAINING PROGRAM

## 2024-06-03 PROCEDURE — 73564 X-RAY EXAM KNEE 4 OR MORE: CPT | Mod: RIGHT SIDE | Performed by: STUDENT IN AN ORGANIZED HEALTH CARE EDUCATION/TRAINING PROGRAM

## 2024-06-03 PROCEDURE — 73564 X-RAY EXAM KNEE 4 OR MORE: CPT | Mod: RT

## 2024-06-03 PROCEDURE — 84075 ASSAY ALKALINE PHOSPHATASE: CPT | Performed by: NURSE PRACTITIONER

## 2024-06-03 PROCEDURE — 73060 X-RAY EXAM OF HUMERUS: CPT | Mod: RT

## 2024-06-03 PROCEDURE — 99284 EMERGENCY DEPT VISIT MOD MDM: CPT | Performed by: EMERGENCY MEDICINE

## 2024-06-03 PROCEDURE — 72170 X-RAY EXAM OF PELVIS: CPT

## 2024-06-03 PROCEDURE — 73060 X-RAY EXAM OF HUMERUS: CPT | Mod: LEFT SIDE | Performed by: STUDENT IN AN ORGANIZED HEALTH CARE EDUCATION/TRAINING PROGRAM

## 2024-06-03 PROCEDURE — 73564 X-RAY EXAM KNEE 4 OR MORE: CPT | Mod: LEFT SIDE | Performed by: STUDENT IN AN ORGANIZED HEALTH CARE EDUCATION/TRAINING PROGRAM

## 2024-06-03 PROCEDURE — 85025 COMPLETE CBC W/AUTO DIFF WBC: CPT | Performed by: NURSE PRACTITIONER

## 2024-06-03 PROCEDURE — 83735 ASSAY OF MAGNESIUM: CPT | Performed by: NURSE PRACTITIONER

## 2024-06-03 RX ORDER — ACETAMINOPHEN 325 MG/1
975 TABLET ORAL ONCE
Status: COMPLETED | OUTPATIENT
Start: 2024-06-03 | End: 2024-06-04

## 2024-06-03 RX ORDER — POTASSIUM CHLORIDE 1.5 G/1.58G
40 POWDER, FOR SOLUTION ORAL ONCE
Status: COMPLETED | OUTPATIENT
Start: 2024-06-03 | End: 2024-06-04

## 2024-06-03 ASSESSMENT — PAIN DESCRIPTION - LOCATION: LOCATION: LEG

## 2024-06-03 ASSESSMENT — COLUMBIA-SUICIDE SEVERITY RATING SCALE - C-SSRS
2. HAVE YOU ACTUALLY HAD ANY THOUGHTS OF KILLING YOURSELF?: NO
6. HAVE YOU EVER DONE ANYTHING, STARTED TO DO ANYTHING, OR PREPARED TO DO ANYTHING TO END YOUR LIFE?: NO
1. IN THE PAST MONTH, HAVE YOU WISHED YOU WERE DEAD OR WISHED YOU COULD GO TO SLEEP AND NOT WAKE UP?: NO

## 2024-06-03 ASSESSMENT — PAIN DESCRIPTION - ORIENTATION: ORIENTATION: RIGHT;LEFT

## 2024-06-03 ASSESSMENT — PAIN DESCRIPTION - PAIN TYPE: TYPE: ACUTE PAIN

## 2024-06-03 ASSESSMENT — PAIN DESCRIPTION - DESCRIPTORS: DESCRIPTORS: SORE

## 2024-06-03 ASSESSMENT — PAIN DESCRIPTION - PROGRESSION: CLINICAL_PROGRESSION: NOT CHANGED

## 2024-06-03 ASSESSMENT — PAIN SCALES - GENERAL: PAINLEVEL_OUTOF10: 3

## 2024-06-03 ASSESSMENT — PAIN - FUNCTIONAL ASSESSMENT: PAIN_FUNCTIONAL_ASSESSMENT: 0-10

## 2024-06-03 ASSESSMENT — PAIN DESCRIPTION - FREQUENCY: FREQUENCY: CONSTANT/CONTINUOUS

## 2024-06-04 ENCOUNTER — HOSPITAL ENCOUNTER (EMERGENCY)
Facility: HOSPITAL | Age: 89
Discharge: HOME | End: 2024-06-04
Attending: EMERGENCY MEDICINE
Payer: MEDICARE

## 2024-06-04 VITALS
SYSTOLIC BLOOD PRESSURE: 157 MMHG | HEART RATE: 65 BPM | RESPIRATION RATE: 16 BRPM | HEIGHT: 64 IN | WEIGHT: 110 LBS | DIASTOLIC BLOOD PRESSURE: 82 MMHG | BODY MASS INDEX: 18.78 KG/M2 | OXYGEN SATURATION: 99 % | TEMPERATURE: 99.9 F

## 2024-06-04 DIAGNOSIS — M25.561 PAIN IN BOTH KNEES, UNSPECIFIED CHRONICITY: Primary | ICD-10-CM

## 2024-06-04 DIAGNOSIS — M25.562 PAIN IN BOTH KNEES, UNSPECIFIED CHRONICITY: Primary | ICD-10-CM

## 2024-06-04 LAB — MAGNESIUM SERPL-MCNC: 2.1 MG/DL (ref 1.6–2.4)

## 2024-06-04 PROCEDURE — 2500000001 HC RX 250 WO HCPCS SELF ADMINISTERED DRUGS (ALT 637 FOR MEDICARE OP): Performed by: NURSE PRACTITIONER

## 2024-06-04 RX ADMIN — ACETAMINOPHEN 975 MG: 325 TABLET ORAL at 01:01

## 2024-06-04 RX ADMIN — POTASSIUM CHLORIDE 40 MEQ: 1.5 POWDER, FOR SOLUTION ORAL at 01:00

## 2024-06-04 ASSESSMENT — PAIN SCALES - GENERAL: PAINLEVEL_OUTOF10: 0 - NO PAIN

## 2024-06-04 NOTE — DISCHARGE INSTRUCTIONS
Please make sure you follow-up with your primary care doctor as we discussed.  This will be very important in order to see if you need PT OT.  Please return to the ER for any worsening or persistent symptoms including worsening pain, difficulty ambulating, recurrent falls.

## 2024-06-04 NOTE — ED TRIAGE NOTES
TRIAGE NOTE   I saw the patient as the Clinician in Triage and performed a brief history and physical exam, established acuity, and ordered appropriate tests to develop basic plan of care. Patient will be seen by an [facility], resident and/or physician who will independently evaluate the patient. Please see subsequent provider notes for further details and disposition.     Brief HPI: In brief, Shira Curry is a 88 y.o. female that presents for inability to ambulate after she fell by tripping over something 2 weeks ago.  She is denying chest pain.  She is denying dyspnea.  She is denying weakness.  She does denies striking her head.  She is not on any anticoagulant medication.  Daughter indicates she is typically quite healthy and ambulates on her own but now she cannot get patient to ambulate.  Patient is denying any weakness and it is all being blamed on pain..  Daughter indicates that they went to the urgent care and urgent care did not offer any imaging and that is why she was brought to the emergency department.    Focused Physical exam:   Focused exam showed tenderness to the right lateral hip tenderness to the bilateral humerus and tenderness to the bilateral knees.  There was no tenderness appreciated to the bilateral tib-fib or bilateral ankle.  Clear bilateral lung sounds.  Normal S1-S2 and rate.  Skin appears to be normal in temperature and color and there does not appear to be any neurologic deficit.  Daughter who is bedside is also an excellent caregiver for patient.  Plan/MDM:   X-rays were ordered of pelvis, bilateral humerus, bilateral knees.  She received 975 acetaminophen.  I am concerned that patient is unable to ambulate and will require admission for inability to ambulate and possible physical therapy.  Please see subsequent provider note for further details and disposition

## 2024-06-04 NOTE — ED TRIAGE NOTES
Pt arrives via wc; c/o bilateral leg pain x1 wk; states she fell 2 wks ago. Denies hitting her head. No blood thinner use. Dtr said pt was limping; has had hard time getting up and walking. Pt lives by herself. AOx4

## 2024-06-05 ENCOUNTER — TELEMEDICINE (OUTPATIENT)
Dept: PRIMARY CARE | Facility: CLINIC | Age: 89
End: 2024-06-05
Payer: MEDICARE
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
## 2024-06-05 PROCEDURE — 99213 OFFICE O/P EST LOW 20 MIN: CPT | Performed by: INTERNAL MEDICINE

## 2024-06-05 ASSESSMENT — ENCOUNTER SYMPTOMS
VOMITING: 0
NAUSEA: 0
ABDOMINAL PAIN: 0
SHORTNESS OF BREATH: 0
FEVER: 0
CHILLS: 0

## 2024-06-05 NOTE — ED PROVIDER NOTES
"HPI:  Shira Curry is a 88 y.o. with a history of HTN, arthritis, presenting to the emergency department complaining of pain to bilateral knees after a fall 2 weeks ago.  Patient states that she tripped over something, had a mechanical fall, hit her knees on the ground, did not hit her head, did not lose consciousness.  States that she has been feeling \"sore\" in her knees since the fall, intermittently been taking Tylenol however not regularly.  Daughter at bedside states that she noticed the patient was limping earlier this week and thus brought her into the ER for evaluation.  She denies any additional symptoms, states that she did not have any prodromal symptoms prior to the fall.  States that she did not have any pain in her hips or the rest of her legs aside from her knees bilaterally.  Currently, she states she feels well.       ------------------------------------------------------------------------------------------------------------------------------------------    Physical Exam:    ED Triage Vitals [06/03/24 2009]   Temperature Heart Rate Respirations BP   37.7 °C (99.9 °F) 66 18 150/82      Pulse Ox Temp Source Heart Rate Source Patient Position   98 % Temporal Monitor --      BP Location FiO2 (%)     -- --         Gen: Alert, NAD  Head/Neck: NCAT, neck w/ FROM  Eyes: EOMI, PERRL, anicteric sclerae, noninjected conjunctivae  Nose: Nares patent w/o rhinorrhea  Mouth:  MMM, no OP lesions noted  Heart: RRR, well perfused  Lungs: CTA b/l no RRW, no increased work of breathing  Abdomen: soft, NT, ND, no rebound guarding or rigidity  Extremities: Warm, well perfused. Compartments soft, nontender to palpation, full range of motion at the hips, knees and ankles.  No significant edema, erythema overlying the knees.  Patient does have a small scab overlying the knee.  2+ distal pulses.  Neurologic: Alert, symmetrical facies, phonates clearly, moves all extremities equally, responsive to touch.   Psychological: " calm, cooperative     ------------------------------------------------------------------------------------------------------------------------------------------    Medical Decision Making  88-year-old female with past medical history of HTN, arthritis, presenting to the emergency department complaining of soreness in bilateral knees after a mechanical fall 2 weeks ago.  No LOC, no AC.  No head strike.  Vital signs are stable, patient is nontoxic.  Exam is as above.  Patient was nipped, labs show mild hypokalemia of 3.2, remaining are unremarkable, she is given 40 of p.o. potassium.  X-rays of bilateral knees, pelvis were obtained showing no acute pathology.  Discussed with patient, she states that she would not like to be admitted for consideration of PT OT, states she feels comfortable being discharged home.  If she is able to ambulate, anticipate discharge home with close outpatient follow-up and return precautions.      Diagnoses as of 06/05/24 1538   Pain in both knees, unspecified chronicity        Procedures    Clinical Impression: knee pain     Dispo: dc    Discussed with ED Attending, Dr. Tyson        This note was dictated with Voice Recognition software, please excuse any dictation errors.     Adriane Tao DO   Emergency Medicine, PGY3      Adriane Tao DO  Resident  06/05/24 1542

## 2024-06-05 NOTE — PROGRESS NOTES
Subjective   Patient ID: Shira Curry is a 88 y.o. female who presents for virtual visit.     Spoke with patient. Seen in the ED after falling at home. States she slipped at home. Was not using her cane. Spoke with daughter who reports she did not know her mom had fallen until she saw her limping. Patient denies any presyncopal symptoms. States she just lost her footing because she wasn't using her cane. She is taking Tylenol as prescribed by the ED. Plain films showed no fractures, some OA in knees.          Review of Systems   Constitutional:  Negative for chills and fever.   Respiratory:  Negative for shortness of breath.    Cardiovascular:  Negative for chest pain.   Gastrointestinal:  Negative for abdominal pain, nausea and vomiting.       Objective   There were no vitals taken for this visit.    Physical Exam  Virtual visit    Assessment/Plan   Fall: we discussed continuing the Tylenol as prescribed. Call back if no better in another 1-2 weeks.
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
## 2024-06-26 DIAGNOSIS — Z91.81 AT RISK FOR FALLING: Primary | ICD-10-CM

## 2024-06-27 ENCOUNTER — APPOINTMENT (OUTPATIENT)
Dept: PHARMACY | Facility: HOSPITAL | Age: 89
End: 2024-06-27
Payer: MEDICARE

## 2024-06-27 ENCOUNTER — APPOINTMENT (OUTPATIENT)
Dept: OTOLARYNGOLOGY | Facility: CLINIC | Age: 89
End: 2024-06-27
Payer: MEDICARE

## 2024-06-27 ENCOUNTER — APPOINTMENT (OUTPATIENT)
Dept: GERIATRIC MEDICINE | Facility: CLINIC | Age: 89
End: 2024-06-27
Payer: MEDICARE

## 2024-07-05 DIAGNOSIS — I10 PRIMARY HYPERTENSION: ICD-10-CM

## 2024-07-09 ENCOUNTER — OFFICE VISIT (OUTPATIENT)
Dept: PRIMARY CARE | Facility: CLINIC | Age: 89
End: 2024-07-09
Payer: MEDICARE

## 2024-07-09 VITALS
DIASTOLIC BLOOD PRESSURE: 73 MMHG | TEMPERATURE: 97.5 F | SYSTOLIC BLOOD PRESSURE: 126 MMHG | HEART RATE: 71 BPM | OXYGEN SATURATION: 98 % | BODY MASS INDEX: 17.61 KG/M2 | RESPIRATION RATE: 14 BRPM | HEIGHT: 63 IN | WEIGHT: 99.4 LBS
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
## 2024-07-09 DIAGNOSIS — I10 PRIMARY HYPERTENSION: ICD-10-CM

## 2024-07-09 DIAGNOSIS — M25.561 ACUTE PAIN OF BOTH KNEES: Primary | ICD-10-CM

## 2024-07-09 DIAGNOSIS — M25.562 ACUTE PAIN OF BOTH KNEES: Primary | ICD-10-CM

## 2024-07-09 PROCEDURE — 3074F SYST BP LT 130 MM HG: CPT | Performed by: INTERNAL MEDICINE

## 2024-07-09 PROCEDURE — 3078F DIAST BP <80 MM HG: CPT | Performed by: INTERNAL MEDICINE

## 2024-07-09 PROCEDURE — 99213 OFFICE O/P EST LOW 20 MIN: CPT | Performed by: INTERNAL MEDICINE

## 2024-07-09 PROCEDURE — 1126F AMNT PAIN NOTED NONE PRSNT: CPT | Performed by: INTERNAL MEDICINE

## 2024-07-09 PROCEDURE — 1159F MED LIST DOCD IN RCRD: CPT | Performed by: INTERNAL MEDICINE

## 2024-07-09 RX ORDER — HYDROCHLOROTHIAZIDE 25 MG/1
25 TABLET ORAL DAILY
Qty: 90 TABLET | Refills: 1 | Status: SHIPPED | OUTPATIENT
Start: 2024-07-09

## 2024-07-09 RX ORDER — HYDROCHLOROTHIAZIDE 25 MG/1
25 TABLET ORAL DAILY
Qty: 90 TABLET | Refills: 1 | OUTPATIENT
Start: 2024-07-09

## 2024-07-09 SDOH — ECONOMIC STABILITY: FOOD INSECURITY: WITHIN THE PAST 12 MONTHS, THE FOOD YOU BOUGHT JUST DIDN'T LAST AND YOU DIDN'T HAVE MONEY TO GET MORE.: NEVER TRUE

## 2024-07-09 SDOH — ECONOMIC STABILITY: FOOD INSECURITY: WITHIN THE PAST 12 MONTHS, YOU WORRIED THAT YOUR FOOD WOULD RUN OUT BEFORE YOU GOT MONEY TO BUY MORE.: NEVER TRUE

## 2024-07-09 ASSESSMENT — LIFESTYLE VARIABLES: HOW MANY STANDARD DRINKS CONTAINING ALCOHOL DO YOU HAVE ON A TYPICAL DAY: PATIENT DOES NOT DRINK

## 2024-07-09 ASSESSMENT — PAIN SCALES - GENERAL: PAINLEVEL: 0-NO PAIN

## 2024-07-09 ASSESSMENT — ENCOUNTER SYMPTOMS
OCCASIONAL FEELINGS OF UNSTEADINESS: 1
DEPRESSION: 0
LOSS OF SENSATION IN FEET: 0

## 2024-07-10 PROBLEM — M25.562 ACUTE PAIN OF BOTH KNEES: Status: ACTIVE | Noted: 2024-07-10
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
## 2024-07-10 PROBLEM — M25.561 ACUTE PAIN OF BOTH KNEES: Status: ACTIVE | Noted: 2024-07-10

## 2024-07-10 ASSESSMENT — ENCOUNTER SYMPTOMS
CHILLS: 0
FEVER: 0
NAUSEA: 0
ABDOMINAL PAIN: 0
VOMITING: 0
SHORTNESS OF BREATH: 0

## 2024-07-10 NOTE — PROGRESS NOTES
"Subjective   Patient ID: Shira Curry is a 88 y.o. female who presents for Med Refill (Recent fall).    Presents for follow up. Suffered a mechanical fall at home onto her knees. Seen in ED. No fractures. Walking better now. Using a rollater for better stability. Using Tylenol for pain.     Med Refill  Pertinent negatives include no abdominal pain, chest pain, chills, fever, nausea or vomiting.        Review of Systems   Constitutional:  Negative for chills and fever.   Respiratory:  Negative for shortness of breath.    Cardiovascular:  Negative for chest pain.   Gastrointestinal:  Negative for abdominal pain, nausea and vomiting.       Objective   /73   Pulse 71   Temp 36.4 °C (97.5 °F)   Resp 14   Ht 1.6 m (5' 3\")   Wt 45.1 kg (99 lb 6.4 oz)   SpO2 98%   BMI 17.61 kg/m²     Physical Exam  Gen appearance: well groomed in NAD  A & O: alert and oriented x 3  CV: RRR   Lungs: CTA bilaterally  Extr: no edema   Assessment/Plan   Knee pain: s/p fall. Continue to monitor.  HTN: stable  Fall: cont use of rollator.       "

## 2024-08-02 ENCOUNTER — APPOINTMENT (OUTPATIENT)
Dept: OPHTHALMOLOGY | Facility: CLINIC | Age: 89
End: 2024-08-02
Payer: MEDICARE

## 2024-08-09 ENCOUNTER — APPOINTMENT (OUTPATIENT)
Dept: BEHAVIORAL HEALTH | Facility: CLINIC | Age: 89
End: 2024-08-09
Payer: MEDICARE

## 2024-08-14 DIAGNOSIS — F34.1 PERSISTENT DEPRESSIVE DISORDER: ICD-10-CM

## 2024-08-14 RX ORDER — CLONAZEPAM 0.5 MG/1
0.5 TABLET ORAL NIGHTLY
Qty: 30 TABLET | Refills: 2 | Status: SHIPPED | OUTPATIENT
Start: 2024-08-14

## 2024-08-19 ENCOUNTER — DOCUMENTATION (OUTPATIENT)
Dept: BEHAVIORAL HEALTH | Facility: CLINIC | Age: 89
End: 2024-08-19

## 2024-08-19 ENCOUNTER — TELEPHONE (OUTPATIENT)
Dept: OTHER | Age: 89
End: 2024-08-19

## 2024-08-19 ENCOUNTER — APPOINTMENT (OUTPATIENT)
Dept: BEHAVIORAL HEALTH | Facility: CLINIC | Age: 89
End: 2024-08-19
Payer: MEDICARE

## 2024-08-19 NOTE — TELEPHONE ENCOUNTER
Pt's daughter calling to correct the phone #'s in pt's chart.  Phone call appointments should be dialed to  [phone].  (Now saved in chart)    Macarena is hoping you could possibly call pt today at correct #, [phone].  I advised her that you are very busy today and appointment notes suggested a reschedule.

## 2024-08-19 NOTE — PROGRESS NOTES
Patient was scheduled for follow up visit virtually (phone) today at 9AM. Called phone number at 9:04AM - went to voicemail; left message advising that I will call again. Called again at 9:10AM but went to voicemail again. Advised to call office ([phone]) to schedule another follow up visit, preferably in person or with video visit. Patient considered no-show.

## 2024-09-27 ENCOUNTER — APPOINTMENT (OUTPATIENT)
Dept: BEHAVIORAL HEALTH | Facility: CLINIC | Age: 89
End: 2024-09-27
Payer: MEDICARE

## 2024-09-27 DIAGNOSIS — F34.1 PERSISTENT DEPRESSIVE DISORDER: ICD-10-CM

## 2024-09-27 PROCEDURE — 1159F MED LIST DOCD IN RCRD: CPT | Performed by: PSYCHIATRY & NEUROLOGY

## 2024-09-27 PROCEDURE — 99212 OFFICE O/P EST SF 10 MIN: CPT | Performed by: PSYCHIATRY & NEUROLOGY

## 2024-09-27 PROCEDURE — 1160F RVW MEDS BY RX/DR IN RCRD: CPT | Performed by: PSYCHIATRY & NEUROLOGY

## 2024-09-27 NOTE — PATIENT INSTRUCTIONS
Plan:   - Continue remeron 30mg at bedtime.   - Continue clonazepam 0.5mg at bedtime.   - Follow up in about 3 months in-person.  - Contact via Intent or call sooner ([phone]) in case of any questions or concerns.  - Call [phone] for mental health crisis or suicidal thoughts, or call 911 or go to the nearest emergency room for emergencies.    Brain-healthy lifestyle:   - Make sure your medical conditions (if any) such as diabetes, high blood pressure, high cholesterol, thyroid disease sleep apnea are optimally controlled.   - Use eyeglasses or hearing aids appropriately if needed.   - Eat a heart healthy diet (like a Mediterranean diet; lots of fruits and vegetables, fish; low fat;)  - Exercise regularly as tolerated.   - Maintain good sleep hygiene; avoid daytime naps; try to get 7 to 8 hours of continuous sleep at night.   - Stay mentally active - puzzles, word searches, books, playing cards.  - Stay socially active and engaged.

## 2024-09-27 NOTE — PROGRESS NOTES
"Outpatient Psychiatry    Ms. Shira Curry is an 89-year old woman with a history of depression, hypothyroidism, HTN, IBS. Follow up done virtually today.     Virtual or Telephone Consent    A telephone visit (audio only) between the patient (at the originating site) and the provider (at the distant site) was utilized to provide this telehealth service.   Verbal consent was requested and obtained from Shira Curry on this date, 09/27/24 for a telehealth visit.     Reason for Visit:  Follow up for depression    Subjective     She says she is \"pretty okay.\"     She says sometimes she does not feel well physically, has stomach problems. She says she is now using a walker.     She says her mood is \"okay\" but may feel down once in a while because she is \"older.\"   She says she sleeps well. Takes mirtazapine and clonazepam at bedtime to help her sleep.   Appetite is alright; says weight is stable. She notes that her diet is limited because of IBS.   Denies anhedonia; she likes to do activities in the building although she notes that they don't have as many activities like they used to.   Denies hopelessness or worthlessness.   No death wishes, suicidal thoughts, intent or plans.   No HI.     Denies AVH, paranoia or delusions.     Denies excessive worry.   She denies panic attacks.      She says her memory is \"pretty good\" but she sometimes forgets things like dates.   She says she is still independent with ADLs and IADLs.   She does not drive.   She buys prepared food. Her daughter may sometimes bring food.   She manages her own bills and denies any problems. Her daughter may help.   She does some cleaning in the house.   Takes medications on her own; does not use pillbox.   She says her daughters may help remind her of appointments.      She lives in a senior building.   Has six daughters; usually in contact with most of them; one daughter is out of town in [place] but calls frequently.       Current medications: "   Mirtazapine 30mg at bedtime  Clonazepam 0.5mg at bedtime.      Current Medications:    Current Outpatient Medications:     clonazePAM (KlonoPIN) 0.5 mg tablet, TAKE 1 TABLET (0.5 MG) BY MOUTH ONCE DAILY AT BEDTIME., Disp: 30 tablet, Rfl: 2    francy.stocking,thigh,reg,med misc, 1 Units see administration instructions., Disp: , Rfl:     hydroCHLOROthiazide (HYDRODiuril) 25 mg tablet, Take 1 tablet (25 mg) by mouth once daily., Disp: 90 tablet, Rfl: 1    levothyroxine (Synthroid, Levoxyl) 100 mcg tablet, Take 1 tablet (100 mcg) by mouth once daily., Disp: 90 tablet, Rfl: 1    mirtazapine (Remeron) 30 mg tablet, Take 1 tablet (30 mg) by mouth once daily at bedtime., Disp: 30 tablet, Rfl: 3    prednisoLONE acetate (Pred Forte) 1 % ophthalmic suspension, Administer 1 drop into affected eye(s) see administration instructions., Disp: , Rfl:   Medical History:  Past Medical History:   Diagnosis Date    Disease of thyroid gland     Hypertension     Pain in unspecified hip 06/15/2021    Hip pain     Surgical History:  Past Surgical History:   Procedure Laterality Date    COLONOSCOPY  12/30/2015    Complete Colonoscopy    HYSTERECTOMY       Family History:  Family History   Problem Relation Name Age of Onset    Hypertension Mother      Breast cancer Mother       Social History:  Social History     Socioeconomic History    Marital status:      Spouse name: Not on file    Number of children: Not on file    Years of education: Not on file    Highest education level: Not on file   Occupational History    Not on file   Tobacco Use    Smoking status: Never    Smokeless tobacco: Never   Substance and Sexual Activity    Alcohol use: Not Currently    Drug use: Never    Sexual activity: Not on file   Other Topics Concern    Not on file   Social History Narrative    Not on file     Social Determinants of Health     Financial Resource Strain: Not on file   Food Insecurity: No Food Insecurity (7/9/2024)    Hunger Vital Sign      "Worried About Running Out of Food in the Last Year: Never true     Ran Out of Food in the Last Year: Never true   Transportation Needs: Not on file   Physical Activity: Not on file   Stress: Not on file   Social Connections: Not on file   Intimate Partner Violence: Not on file   Housing Stability: Not on file         Record Review: brief       Psychiatric Review Of Systems:  As above.      Medical Review Of Systems:  Pertinent items are noted in HPI.      Objective   Mental Status Exam:  Behavior/Attitude: Cooperative. Pleasant.   Speech: Regular in rate, soft tone, and regular volume. No pressure.  Mood: \"okay.\"  Thought process: Goal-directed. Linear. Organized.  Thought content: No paranoia, delusion or ideas of reference elicited. No hallucinations in auditory, visual or other sensory modalities.   Suicidal ideation: denied.  Homicidal ideation: denied.   Insight: Fair.  Judgment: Fair.  Recent and remote memory: fair recall of recent and remote autobiographical memories.   Attention/concentration: intact during visit  Language: No aphasia or paraphasic errors during conversation   Fund of knowledge: Average    Orientation: oriented to day, month, year but off by one day on date. Able to state full home address.   Knew name of current president and his predecessor; name of vice-president.   Current events: \"tornadoes and storms.\" Able to provide more details after prompting.     Vitals:  There were no vitals filed for this visit.      Assessment/Plan   Diagnosis:   Major depressive disorder, in full remission    Assessment:   Ms. Shira Curry is an 89-year old woman with a history of depression, hypothyroidism, HTN, IBS. Follow up done virtually (phone only) today.      9/27/24: Mood continues to be stable. Lives in apartment by herself; family provides support.     Treatment Plan/Recommendations:   - Continue remeron 30mg at bedtime.   - Continue clonazepam 0.5mg at bedtime. (She has tried lowering clonazepam " but could not tolerate.)  - Encouraged healthy lifestyle.   - Follow up in about 3 months.       Review with patient: Treatment plan reviewed with the patient.  Medication risks/benefit reviewed with the patient      Elizabeth Guerrero MD

## 2024-10-16 ENCOUNTER — LAB (OUTPATIENT)
Dept: LAB | Facility: LAB | Age: 89
End: 2024-10-16
Payer: COMMERCIAL

## 2024-10-16 DIAGNOSIS — E03.9 HYPOTHYROIDISM, UNSPECIFIED TYPE: ICD-10-CM

## 2024-10-16 DIAGNOSIS — F34.1 PERSISTENT DEPRESSIVE DISORDER: ICD-10-CM

## 2024-10-16 LAB
T4 FREE SERPL-MCNC: 0.34 NG/DL (ref 0.61–1.12)
TSH SERPL-ACNC: 101 MIU/L (ref 0.44–3.98)

## 2024-10-16 PROCEDURE — 36415 COLL VENOUS BLD VENIPUNCTURE: CPT

## 2024-10-16 PROCEDURE — 84443 ASSAY THYROID STIM HORMONE: CPT

## 2024-10-16 PROCEDURE — 84439 ASSAY OF FREE THYROXINE: CPT

## 2024-10-16 RX ORDER — MIRTAZAPINE 30 MG/1
30 TABLET, FILM COATED ORAL NIGHTLY
Qty: 90 TABLET | Refills: 1 | Status: SHIPPED | OUTPATIENT
Start: 2024-10-16 | End: 2025-04-14

## 2024-10-17 DIAGNOSIS — I10 PRIMARY HYPERTENSION: ICD-10-CM

## 2024-10-21 RX ORDER — HYDROCHLOROTHIAZIDE 25 MG/1
25 TABLET ORAL DAILY
Qty: 90 TABLET | Refills: 1 | Status: SHIPPED | OUTPATIENT
Start: 2024-10-21

## 2024-10-23 ENCOUNTER — OFFICE VISIT (OUTPATIENT)
Dept: PRIMARY CARE | Facility: CLINIC | Age: 89
End: 2024-10-23
Payer: MEDICARE

## 2024-10-23 VITALS
DIASTOLIC BLOOD PRESSURE: 82 MMHG | RESPIRATION RATE: 16 BRPM | TEMPERATURE: 97.1 F | SYSTOLIC BLOOD PRESSURE: 128 MMHG | HEIGHT: 63 IN | OXYGEN SATURATION: 99 % | BODY MASS INDEX: 16.51 KG/M2 | WEIGHT: 93.2 LBS | HEART RATE: 85 BPM

## 2024-10-23 DIAGNOSIS — E03.9 HYPOTHYROIDISM, UNSPECIFIED TYPE: ICD-10-CM

## 2024-10-23 DIAGNOSIS — F34.1 PERSISTENT DEPRESSIVE DISORDER: ICD-10-CM

## 2024-10-23 DIAGNOSIS — I10 PRIMARY HYPERTENSION: Primary | ICD-10-CM

## 2024-10-23 PROCEDURE — 99215 OFFICE O/P EST HI 40 MIN: CPT | Performed by: INTERNAL MEDICINE

## 2024-10-23 PROCEDURE — 3079F DIAST BP 80-89 MM HG: CPT | Performed by: INTERNAL MEDICINE

## 2024-10-23 PROCEDURE — 1170F FXNL STATUS ASSESSED: CPT | Performed by: INTERNAL MEDICINE

## 2024-10-23 PROCEDURE — 1160F RVW MEDS BY RX/DR IN RCRD: CPT | Performed by: INTERNAL MEDICINE

## 2024-10-23 PROCEDURE — G0439 PPPS, SUBSEQ VISIT: HCPCS | Performed by: INTERNAL MEDICINE

## 2024-10-23 PROCEDURE — 3074F SYST BP LT 130 MM HG: CPT | Performed by: INTERNAL MEDICINE

## 2024-10-23 PROCEDURE — 1126F AMNT PAIN NOTED NONE PRSNT: CPT | Performed by: INTERNAL MEDICINE

## 2024-10-23 PROCEDURE — 1036F TOBACCO NON-USER: CPT | Performed by: INTERNAL MEDICINE

## 2024-10-23 PROCEDURE — 1159F MED LIST DOCD IN RCRD: CPT | Performed by: INTERNAL MEDICINE

## 2024-10-23 RX ORDER — LEVOTHYROXINE SODIUM 100 UG/1
100 TABLET ORAL DAILY
Qty: 90 TABLET | Refills: 1 | Status: SHIPPED | OUTPATIENT
Start: 2024-10-23 | End: 2025-10-23

## 2024-10-23 SDOH — ECONOMIC STABILITY: FOOD INSECURITY: WITHIN THE PAST 12 MONTHS, THE FOOD YOU BOUGHT JUST DIDN'T LAST AND YOU DIDN'T HAVE MONEY TO GET MORE.: NEVER TRUE

## 2024-10-23 SDOH — ECONOMIC STABILITY: FOOD INSECURITY: WITHIN THE PAST 12 MONTHS, YOU WORRIED THAT YOUR FOOD WOULD RUN OUT BEFORE YOU GOT MONEY TO BUY MORE.: NEVER TRUE

## 2024-10-23 ASSESSMENT — PATIENT HEALTH QUESTIONNAIRE - PHQ9
SUM OF ALL RESPONSES TO PHQ9 QUESTIONS 1 AND 2: 0
1. LITTLE INTEREST OR PLEASURE IN DOING THINGS: NOT AT ALL
2. FEELING DOWN, DEPRESSED OR HOPELESS: NOT AT ALL

## 2024-10-23 ASSESSMENT — ACTIVITIES OF DAILY LIVING (ADL)
BATHING: INDEPENDENT
JUDGMENT_ADEQUATE_SAFELY_COMPLETE_DAILY_ACTIVITIES: YES
PATIENT'S MEMORY ADEQUATE TO SAFELY COMPLETE DAILY ACTIVITIES?: YES
FEEDING YOURSELF: INDEPENDENT
BATHING: INDEPENDENT
HEARING - RIGHT EAR: FUNCTIONAL
GROOMING: INDEPENDENT
ADEQUATE_TO_COMPLETE_ADL: YES
DOING_HOUSEWORK: NEEDS ASSISTANCE
DRESSING: INDEPENDENT
TOILETING: INDEPENDENT
TAKING_MEDICATION: INDEPENDENT
GROCERY_SHOPPING: NEEDS ASSISTANCE
ASSISTIVE_DEVICE: WALKER
MANAGING_FINANCES: NEEDS ASSISTANCE
HEARING - LEFT EAR: FUNCTIONAL
DRESSING YOURSELF: INDEPENDENT
WALKS IN HOME: INDEPENDENT

## 2024-10-23 ASSESSMENT — ENCOUNTER SYMPTOMS
LOSS OF SENSATION IN FEET: 0
SHORTNESS OF BREATH: 0
DEPRESSION: 0
NAUSEA: 0
CHILLS: 0
ABDOMINAL PAIN: 0
FEVER: 0
OCCASIONAL FEELINGS OF UNSTEADINESS: 1
VOMITING: 0

## 2024-10-23 ASSESSMENT — PAIN SCALES - GENERAL: PAINLEVEL_OUTOF10: 0-NO PAIN

## 2024-10-23 ASSESSMENT — LIFESTYLE VARIABLES: HOW MANY STANDARD DRINKS CONTAINING ALCOHOL DO YOU HAVE ON A TYPICAL DAY: PATIENT DOES NOT DRINK

## 2024-10-23 NOTE — PROGRESS NOTES
Subjective   Patient ID: Shira Curry is a 89 y.o. female who presents for annual exam.     Presents for annual exam. Daughter thinks patient may not actually be taking her thyroid medicine every day. Denies feeling colder than usual. No increased fatigue.          Review of Systems   Constitutional:  Negative for chills and fever.   Respiratory:  Negative for shortness of breath.    Cardiovascular:  Negative for chest pain.   Gastrointestinal:  Negative for abdominal pain, nausea and vomiting.       Objective   There were no vitals taken for this visit.    Physical Exam  Gen: well groomed in NAD  A & O: alert and oriented x 3  HEENT: PEERL, EOMI, TM's clear bilaterally, no carotid bruits, no palpable thyroid nodules  CV: RRR  Lungs: CTA bilaterally  Abd: soft, NT/ND  Extr: no edema  Breasts: no palpable nodules or nipple discharge  Neuro: CN 2-12 intact, motor 5/5, sensation intact  Pelvic: deferred to Gyn   Assessment/Plan   Hypothyur: recheck labs after taking med faithfully for one month. Labs ordered.  HTN: stable  Depression: cont med. Greater than 5 minutes on depression screening.   Declines flu  I spent > 40 minutes in the preparation of seeing the patient including chart review and med rec. Total time with patient >40 minutes for H & P, assessment and plan.

## 2024-11-13 DIAGNOSIS — F34.1 PERSISTENT DEPRESSIVE DISORDER: ICD-10-CM

## 2024-11-13 RX ORDER — CLONAZEPAM 0.5 MG/1
0.5 TABLET ORAL NIGHTLY
Qty: 30 TABLET | Refills: 2 | Status: SHIPPED | OUTPATIENT
Start: 2024-11-13

## 2025-01-20 DIAGNOSIS — F34.1 PERSISTENT DEPRESSIVE DISORDER: ICD-10-CM

## 2025-01-20 RX ORDER — MIRTAZAPINE 30 MG/1
30 TABLET, FILM COATED ORAL NIGHTLY
Qty: 90 TABLET | Refills: 1 | Status: SHIPPED | OUTPATIENT
Start: 2025-01-20 | End: 2025-07-19

## 2025-02-19 DIAGNOSIS — F34.1 PERSISTENT DEPRESSIVE DISORDER: ICD-10-CM

## 2025-02-19 RX ORDER — CLONAZEPAM 0.5 MG/1
0.5 TABLET ORAL NIGHTLY
Qty: 30 TABLET | Refills: 2 | Status: SHIPPED | OUTPATIENT
Start: 2025-02-19

## 2025-02-28 ENCOUNTER — OFFICE VISIT (OUTPATIENT)
Dept: BEHAVIORAL HEALTH | Facility: CLINIC | Age: 89
End: 2025-02-28
Payer: MEDICARE

## 2025-02-28 ENCOUNTER — APPOINTMENT (OUTPATIENT)
Dept: BEHAVIORAL HEALTH | Facility: CLINIC | Age: 89
End: 2025-02-28
Payer: MEDICARE

## 2025-02-28 DIAGNOSIS — F34.1 PERSISTENT DEPRESSIVE DISORDER: ICD-10-CM

## 2025-02-28 PROCEDURE — 99213 OFFICE O/P EST LOW 20 MIN: CPT | Performed by: PSYCHIATRY & NEUROLOGY

## 2025-02-28 NOTE — PROGRESS NOTES
"    Outpatient Psychiatry    Ms. Shira Curry is an 89-year old woman with a history of depression, hypothyroidism, HTN, IBS. Seen in office for follow up today.     Reason for Visit:  Follow up for depression    Subjective     She says she is \"okay.\"   She says her appetite is okay.   She reports sleeping well. She says she gets about 6 hours at night.   No death wishes, suicidal thoughts, intent or plans.   No HI.  Denies AVH, paranoia or delusions.   Denies excessive worry but says she thinks about “some things.”  Her daughter Macarena informed me that she is moving to [facility] assisted living next month. Patient has limited mobility and has been in her apartment most of the time. Patient has been resistant to moving to the assisted livingDenies hopelessness or worthlessness.     She says her memory is \"okay.”   She says she is still independent with ADLs and IADLs. She says her daughters take her to doctors' appointment and help with cleaning.   She does not drive.   She buys prepared food. Her daughter also bring her food.   She says her daughters help her pay the bills.   Takes medications on her own; does not use pillbox. She says sometimes her daughters call and remind her to take medications.   She lives in a senior building.   Has six daughters; usually in contact with most of them; one daughter is out of town in [place] but calls frequently.       Current medications:   Mirtazapine 30mg at bedtime  Clonazepam 0.5mg at bedtime.      Current Medications:    Current Outpatient Medications:     clonazePAM (KlonoPIN) 0.5 mg tablet, TAKE 1 TABLET (0.5 MG) BY MOUTH ONCE DAILY AT BEDTIME., Disp: 30 tablet, Rfl: 2    francy.stocking,thigh,reg,med misc, 1 Units see administration instructions., Disp: , Rfl:     hydroCHLOROthiazide (HYDRODiuril) 25 mg tablet, TAKE 1 TABLET BY MOUTH EVERY DAY, Disp: 90 tablet, Rfl: 1    levothyroxine (Synthroid, Levoxyl) 100 mcg tablet, Take 1 tablet (100 mcg) by mouth once daily., " Disp: 90 tablet, Rfl: 1    mirtazapine (Remeron) 30 mg tablet, TAKE 1 TABLET (30 MG) BY MOUTH ONCE DAILY AT BEDTIME., Disp: 90 tablet, Rfl: 1    prednisoLONE acetate (Pred Forte) 1 % ophthalmic suspension, Administer 1 drop into affected eye(s) see administration instructions., Disp: , Rfl:   Medical History:  Past Medical History:   Diagnosis Date    Disease of thyroid gland     Hypertension     Pain in unspecified hip 06/15/2021    Hip pain     Surgical History:  Past Surgical History:   Procedure Laterality Date    COLONOSCOPY  12/30/2015    Complete Colonoscopy    HYSTERECTOMY       Family History:  Family History   Problem Relation Name Age of Onset    Hypertension Mother      Breast cancer Mother       Social History:  Social History     Socioeconomic History    Marital status:      Spouse name: Not on file    Number of children: Not on file    Years of education: Not on file    Highest education level: Not on file   Occupational History    Not on file   Tobacco Use    Smoking status: Never    Smokeless tobacco: Never   Substance and Sexual Activity    Alcohol use: Not Currently    Drug use: Never    Sexual activity: Not on file   Other Topics Concern    Not on file   Social History Narrative    Not on file     Social Drivers of Health     Financial Resource Strain: Not on file   Food Insecurity: No Food Insecurity (10/23/2024)    Hunger Vital Sign     Worried About Running Out of Food in the Last Year: Never true     Ran Out of Food in the Last Year: Never true   Transportation Needs: Not on file   Physical Activity: Not on file   Stress: Not on file   Social Connections: Not on file   Intimate Partner Violence: Not on file   Housing Stability: Not on file         Record Review: brief       Psychiatric Review Of Systems:  As above.      Medical Review Of Systems:  Pertinent items are noted in HPI.      Objective   Mental Status Exam:  Behavior/Attitude: Cooperative. Pleasant.   Speech: Regular in rate,  "soft tone, and regular volume. No pressure.  Mood: \"okay.\"  Thought process: Goal-directed. Linear. Organized.  Thought content: No paranoia, delusion or ideas of reference elicited. No hallucinations in auditory, visual or other sensory modalities.   Suicidal ideation: denied.  Homicidal ideation: denied.   Insight: Fair.  Judgment: Fair.  Recent and remote memory: fair recall of recent and remote autobiographical memories.   Attention/concentration: intact during visit  Language: No aphasia or paraphasic errors during conversation   Fund of knowledge: Average    Orientation: oriented to day, date, month, year. Able to name place, city and state,   Knew name of current president and his predecessor. Unable to name current .   Current events: \"war in [place], President Yumiko.”      Vitals:  There were no vitals filed for this visit.    Assessment/Plan   Diagnosis:   Major depressive disorder, in full remission    Assessment:   Ms. Shira Curry is an 89-year old woman with a history of depression, hypothyroidism, HTN, IBS. Seen in office for follow up today.      2/28/25: Mood is stable. Family has arranged for her to move to assisted living but patient is resistant.    Treatment Plan/Recommendations:   - Continue remeron 30mg at bedtime.   - Continue clonazepam 0.5mg at bedtime. (She has tried lowering clonazepam but could not tolerate.)  - Agree with moving to assisted living. Provided counseling to patient.   - Encouraged healthy lifestyle.   - Follow up in about 3 months.       Review with patient: Treatment plan reviewed with the patient.  Medication risks/benefit reviewed with the patient      Elizabeth Guerrero MD        Review with patient: Treatment plan reviewed with the patient.  Medication risks/benefit reviewed with the patient      Elizabeth Guerrero MD  "

## 2025-03-01 LAB
T4 FREE SERPL-MCNC: 1.9 NG/DL (ref 0.8–1.8)
TSH SERPL-ACNC: 0.08 MIU/L (ref 0.4–4.5)

## 2025-03-05 ENCOUNTER — OFFICE VISIT (OUTPATIENT)
Dept: PRIMARY CARE | Facility: CLINIC | Age: 89
End: 2025-03-05
Payer: MEDICARE

## 2025-03-05 VITALS
HEIGHT: 63 IN | OXYGEN SATURATION: 100 % | RESPIRATION RATE: 16 BRPM | DIASTOLIC BLOOD PRESSURE: 73 MMHG | WEIGHT: 94.2 LBS | SYSTOLIC BLOOD PRESSURE: 116 MMHG | HEART RATE: 86 BPM | TEMPERATURE: 98.5 F | BODY MASS INDEX: 16.69 KG/M2

## 2025-03-05 DIAGNOSIS — F34.1 PERSISTENT DEPRESSIVE DISORDER: ICD-10-CM

## 2025-03-05 DIAGNOSIS — I10 PRIMARY HYPERTENSION: Primary | ICD-10-CM

## 2025-03-05 DIAGNOSIS — E03.9 HYPOTHYROIDISM, UNSPECIFIED TYPE: ICD-10-CM

## 2025-03-05 PROCEDURE — 1159F MED LIST DOCD IN RCRD: CPT | Performed by: INTERNAL MEDICINE

## 2025-03-05 PROCEDURE — G2211 COMPLEX E/M VISIT ADD ON: HCPCS | Performed by: INTERNAL MEDICINE

## 2025-03-05 PROCEDURE — 3078F DIAST BP <80 MM HG: CPT | Performed by: INTERNAL MEDICINE

## 2025-03-05 PROCEDURE — 99214 OFFICE O/P EST MOD 30 MIN: CPT | Performed by: INTERNAL MEDICINE

## 2025-03-05 PROCEDURE — 1036F TOBACCO NON-USER: CPT | Performed by: INTERNAL MEDICINE

## 2025-03-05 PROCEDURE — 3074F SYST BP LT 130 MM HG: CPT | Performed by: INTERNAL MEDICINE

## 2025-03-05 PROCEDURE — 1126F AMNT PAIN NOTED NONE PRSNT: CPT | Performed by: INTERNAL MEDICINE

## 2025-03-05 SDOH — ECONOMIC STABILITY: FOOD INSECURITY: WITHIN THE PAST 12 MONTHS, YOU WORRIED THAT YOUR FOOD WOULD RUN OUT BEFORE YOU GOT MONEY TO BUY MORE.: NEVER TRUE

## 2025-03-05 SDOH — ECONOMIC STABILITY: FOOD INSECURITY: WITHIN THE PAST 12 MONTHS, THE FOOD YOU BOUGHT JUST DIDN'T LAST AND YOU DIDN'T HAVE MONEY TO GET MORE.: NEVER TRUE

## 2025-03-05 ASSESSMENT — PATIENT HEALTH QUESTIONNAIRE - PHQ9
1. LITTLE INTEREST OR PLEASURE IN DOING THINGS: NOT AT ALL
2. FEELING DOWN, DEPRESSED OR HOPELESS: NOT AT ALL
SUM OF ALL RESPONSES TO PHQ9 QUESTIONS 1 AND 2: 0

## 2025-03-05 ASSESSMENT — ENCOUNTER SYMPTOMS
OCCASIONAL FEELINGS OF UNSTEADINESS: 0
DEPRESSION: 0
LOSS OF SENSATION IN FEET: 0

## 2025-03-05 ASSESSMENT — LIFESTYLE VARIABLES: HOW MANY STANDARD DRINKS CONTAINING ALCOHOL DO YOU HAVE ON A TYPICAL DAY: PATIENT DOES NOT DRINK

## 2025-03-05 ASSESSMENT — PAIN SCALES - GENERAL: PAINLEVEL_OUTOF10: 0-NO PAIN

## 2025-03-05 NOTE — PROGRESS NOTES
"Subjective   Patient ID: Shira Curry is a 89 y.o. female who presents for Follow-up.    Has been doing well, recently saw psychiatry. Thyroid labs obtained prior to this visit are appropriate, she is taking the 100mg of Synthroid daily with no adverse effects.       Objective   /73   Pulse 86   Temp 36.9 °C (98.5 °F) (Temporal)   Resp 16   Ht 1.6 m (5' 3\")   Wt (!) 42.7 kg (94 lb 3.2 oz)   SpO2 100%   BMI 16.69 kg/m²     Gen appearance: thin woman, in no acute distress  A & O: alert and oriented x 3  CV: RRR   Lungs: CTA bilaterally  Extr: no edema    Assessment/Plan   Hypothyroidism: continue medication  HTN: stable  Depression: cont med.   RTO in June for CPE  We discussed her eminent move to an assisted living facility. She is not happy about it but seems reconciled that it needs to happen.   Greater than 40 minutes were spent on the care and coordination of care of the patient.      "

## 2025-03-17 DIAGNOSIS — F34.1 PERSISTENT DEPRESSIVE DISORDER: ICD-10-CM

## 2025-03-18 RX ORDER — CLONAZEPAM 0.5 MG/1
0.5 TABLET ORAL NIGHTLY
Qty: 30 TABLET | Refills: 2 | Status: SHIPPED | OUTPATIENT
Start: 2025-03-18

## 2025-04-01 ENCOUNTER — APPOINTMENT (OUTPATIENT)
Dept: BEHAVIORAL HEALTH | Facility: CLINIC | Age: 89
End: 2025-04-01
Payer: MEDICARE

## 2025-04-01 DIAGNOSIS — F34.1 PERSISTENT DEPRESSIVE DISORDER: ICD-10-CM

## 2025-04-01 NOTE — PROGRESS NOTES
"    Outpatient Psychiatry    Ms. Shira Curry is an 89-year old woman with a history of depression, hypothyroidism, HTN, IBS. Follow up done on the phone today. Patient was unable to do video visit.     Virtual or Telephone Consent    While technically available, the patient was unable or unwilling to consent to connect via audio/video telehealth technology; therefore, I performed this visit using a real-time audio only connection between Shira Curry & Elizabeth Guerrero MD.  Verbal consent was requested and obtained from Shira Curry on this date, 04/01/25 for a telehealth visit and the patient's location was confirmed at the time of the visit.    Reason for Visit:  Follow up for depression    Subjective     She says she is \"okay.\"   She says her mood is generally okay.   She says she does not sleep that well.   She says her appetite is fine.   No death wishes, suicidal thoughts, intent or plans.   No HI.  Denies AVH, paranoia or delusions.     Denies excessive worry or anxiety.     She says she moved to a different apartment. Per family, she has moved to assisted living ([facility]). Patient says this is temporary and she is moving back to her apartment in [place] soon.   She says the staff comes to administer medications.   She says they bring food up to her apartment.   She says she fell in her apartment a few months ago. She uses a shower chair now.     She says her memory is \"okay.”   She does not drive.   Her daughters manage her finances.     Has six daughters; has two daughters (Summer, Macarena) in [place] who help.      Current medications:   Mirtazapine 30mg at bedtime  Clonazepam 0.5mg at bedtime.    Current Medications:    Current Outpatient Medications:     clonazePAM (KlonoPIN) 0.5 mg tablet, Take 1 tablet (0.5 mg) by mouth once daily at bedtime., Disp: 30 tablet, Rfl: 2    francy.stocking,thigh,reg,med misc, 1 Units see administration instructions., Disp: , Rfl:     hydroCHLOROthiazide " (HYDRODiuril) 25 mg tablet, TAKE 1 TABLET BY MOUTH EVERY DAY, Disp: 90 tablet, Rfl: 1    levothyroxine (Synthroid, Levoxyl) 100 mcg tablet, Take 1 tablet (100 mcg) by mouth once daily., Disp: 90 tablet, Rfl: 1    mirtazapine (Remeron) 30 mg tablet, TAKE 1 TABLET (30 MG) BY MOUTH ONCE DAILY AT BEDTIME., Disp: 90 tablet, Rfl: 1    prednisoLONE acetate (Pred Forte) 1 % ophthalmic suspension, Administer 1 drop into affected eye(s) see administration instructions., Disp: , Rfl:   Medical History:  Past Medical History:   Diagnosis Date    Disease of thyroid gland     Hypertension     Pain in unspecified hip 06/15/2021    Hip pain     Surgical History:  Past Surgical History:   Procedure Laterality Date    COLONOSCOPY  12/30/2015    Complete Colonoscopy    HYSTERECTOMY       Family History:  Family History   Problem Relation Name Age of Onset    Hypertension Mother      Breast cancer Mother       Social History:  Social History     Socioeconomic History    Marital status:      Spouse name: Not on file    Number of children: Not on file    Years of education: Not on file    Highest education level: Not on file   Occupational History    Not on file   Tobacco Use    Smoking status: Never    Smokeless tobacco: Never   Substance and Sexual Activity    Alcohol use: Not Currently    Drug use: Never    Sexual activity: Not on file   Other Topics Concern    Not on file   Social History Narrative    Not on file     Social Drivers of Health     Financial Resource Strain: Not on file   Food Insecurity: No Food Insecurity (3/5/2025)    Hunger Vital Sign     Worried About Running Out of Food in the Last Year: Never true     Ran Out of Food in the Last Year: Never true   Transportation Needs: Not on file   Physical Activity: Not on file   Stress: Not on file   Social Connections: Not on file   Intimate Partner Violence: Not on file   Housing Stability: Not on file         Record Review: brief       Psychiatric Review Of  "Systems:  As above.      Medical Review Of Systems:  Pertinent items are noted in HPI.      Objective   Mental Status Exam:  Behavior/Attitude: Limited engagement today.   Speech: Regular in rate, soft tone, and regular volume. No pressure.  Mood: \"okay.\"  Thought process: Fairly goal-directed.   Thought content: No paranoia, delusion or ideas of reference elicited. No hallucinations in auditory, visual or other sensory modalities.   Suicidal ideation: denied.  Homicidal ideation: denied.   Insight: Partial insight into need for assistance.   Judgment: Questionable.  Recent and remote memory: fair recall of major recent and remote autobiographical memories. Was not able to provide details.   Attention/concentration: intact during visit  Language: No aphasia or paraphasic errors during conversation   Fund of knowledge: Average    Orientation: oriented to day, month, year but not date. Able to name the place she is in now, city and state.   Knew name of current president and his predecessor. Unable to name current .   Not able to discuss current events.     Vitals:  There were no vitals filed for this visit.    Assessment/Plan   Diagnosis:   Major depressive disorder, in full remission    Assessment:   Ms. Shira Curry is an 89-year old woman with a history of depression, hypothyroidism, HTN, IBS. Follow up done virtually (on phone) today.      4/1/25: Mood is stable. Family has arranged for her to move to assisted living but patient is resistant.    Treatment Plan/Recommendations:   - Continue remeron 30mg at bedtime.   - Continue clonazepam 0.5mg at bedtime. (She has tried lowering clonazepam but could not tolerate.)  - Agree with moving to assisted living.   - Encouraged healthy lifestyle.   - Follow up in about 3 months. Will need to be in-person or with video.    Review with patient: Treatment plan reviewed with the patient.  Medication risks/benefit reviewed with the patient.    Time spent on phone " with patient: 11 minutes.     Elizabeth Guerrero MD.

## 2025-04-01 NOTE — PATIENT INSTRUCTIONS
Plan:   - Continue remeron 30mg at bedtime.   - Continue clonazepam 0.5mg at bedtime.  - Follow up in about 3 months. Will need to be in-person or with video.  - Contact via [facility] or call sooner ([phone]) in case of any questions or concerns.  - Call [phone] for mental health crisis or suicidal thoughts, or call 911 or go to the nearest emergency room for emergencies.    Brain-healthy lifestyle:   - Make sure your medical conditions (if any) such as diabetes, high blood pressure, high cholesterol, thyroid disease sleep apnea are optimally controlled.   - Use eyeglasses or hearing aids appropriately if needed.   - Eat a heart healthy diet (like a Mediterranean diet; lots of fruits and vegetables, fish; low fat;)  - Exercise regularly as tolerated.   - Maintain good sleep hygiene; avoid daytime naps; try to get 7 to 8 hours of continuous sleep at night.   - Stay mentally active - puzzles, word searches, books, playing cards.  - Stay socially active and engaged.

## 2025-04-25 ENCOUNTER — APPOINTMENT (OUTPATIENT)
Dept: BEHAVIORAL HEALTH | Facility: CLINIC | Age: 89
End: 2025-04-25
Payer: MEDICARE

## 2025-06-25 ENCOUNTER — OFFICE VISIT (OUTPATIENT)
Dept: PRIMARY CARE | Facility: CLINIC | Age: 89
End: 2025-06-25
Payer: COMMERCIAL

## 2025-06-25 VITALS
HEART RATE: 103 BPM | SYSTOLIC BLOOD PRESSURE: 124 MMHG | BODY MASS INDEX: 17.36 KG/M2 | DIASTOLIC BLOOD PRESSURE: 80 MMHG | TEMPERATURE: 97.8 F | RESPIRATION RATE: 16 BRPM | WEIGHT: 98 LBS | HEIGHT: 63 IN

## 2025-06-25 DIAGNOSIS — Z00.00 ROUTINE GENERAL MEDICAL EXAMINATION AT HEALTH CARE FACILITY: Primary | ICD-10-CM

## 2025-06-25 DIAGNOSIS — E03.9 HYPOTHYROIDISM, UNSPECIFIED TYPE: ICD-10-CM

## 2025-06-25 DIAGNOSIS — I10 PRIMARY HYPERTENSION: ICD-10-CM

## 2025-06-25 DIAGNOSIS — F34.1 PERSISTENT DEPRESSIVE DISORDER: ICD-10-CM

## 2025-06-25 DIAGNOSIS — R79.89 LOW VITAMIN D LEVEL: ICD-10-CM

## 2025-06-25 PROCEDURE — 99215 OFFICE O/P EST HI 40 MIN: CPT | Performed by: INTERNAL MEDICINE

## 2025-06-25 PROCEDURE — 3074F SYST BP LT 130 MM HG: CPT | Performed by: INTERNAL MEDICINE

## 2025-06-25 PROCEDURE — G0444 DEPRESSION SCREEN ANNUAL: HCPCS | Performed by: INTERNAL MEDICINE

## 2025-06-25 PROCEDURE — G0439 PPPS, SUBSEQ VISIT: HCPCS | Performed by: INTERNAL MEDICINE

## 2025-06-25 PROCEDURE — 1170F FXNL STATUS ASSESSED: CPT | Performed by: INTERNAL MEDICINE

## 2025-06-25 PROCEDURE — 1126F AMNT PAIN NOTED NONE PRSNT: CPT | Performed by: INTERNAL MEDICINE

## 2025-06-25 PROCEDURE — 3079F DIAST BP 80-89 MM HG: CPT | Performed by: INTERNAL MEDICINE

## 2025-06-25 PROCEDURE — 36415 COLL VENOUS BLD VENIPUNCTURE: CPT | Performed by: INTERNAL MEDICINE

## 2025-06-25 PROCEDURE — 1036F TOBACCO NON-USER: CPT | Performed by: INTERNAL MEDICINE

## 2025-06-25 PROCEDURE — 99215 OFFICE O/P EST HI 40 MIN: CPT | Mod: 25 | Performed by: INTERNAL MEDICINE

## 2025-06-25 SDOH — ECONOMIC STABILITY: FOOD INSECURITY: WITHIN THE PAST 12 MONTHS, YOU WORRIED THAT YOUR FOOD WOULD RUN OUT BEFORE YOU GOT MONEY TO BUY MORE.: NEVER TRUE

## 2025-06-25 SDOH — ECONOMIC STABILITY: FOOD INSECURITY: WITHIN THE PAST 12 MONTHS, THE FOOD YOU BOUGHT JUST DIDN'T LAST AND YOU DIDN'T HAVE MONEY TO GET MORE.: NEVER TRUE

## 2025-06-25 ASSESSMENT — ACTIVITIES OF DAILY LIVING (ADL)
BATHING: INDEPENDENT
GROCERY_SHOPPING: NEEDS ASSISTANCE
DRESSING: INDEPENDENT
TAKING_MEDICATION: NEEDS ASSISTANCE
DOING_HOUSEWORK: NEEDS ASSISTANCE
MANAGING_FINANCES: NEEDS ASSISTANCE

## 2025-06-25 ASSESSMENT — ENCOUNTER SYMPTOMS
DEPRESSION: 0
LOSS OF SENSATION IN FEET: 0
CHILLS: 0
FEVER: 0
OCCASIONAL FEELINGS OF UNSTEADINESS: 0
NAUSEA: 0
ABDOMINAL PAIN: 0
VOMITING: 0
SHORTNESS OF BREATH: 0

## 2025-06-25 ASSESSMENT — PATIENT HEALTH QUESTIONNAIRE - PHQ9
SUM OF ALL RESPONSES TO PHQ9 QUESTIONS 1 AND 2: 0
2. FEELING DOWN, DEPRESSED OR HOPELESS: NOT AT ALL
1. LITTLE INTEREST OR PLEASURE IN DOING THINGS: NOT AT ALL

## 2025-06-25 ASSESSMENT — LIFESTYLE VARIABLES: HOW MANY STANDARD DRINKS CONTAINING ALCOHOL DO YOU HAVE ON A TYPICAL DAY: PATIENT DOES NOT DRINK

## 2025-06-25 ASSESSMENT — PAIN SCALES - GENERAL: PAINLEVEL_OUTOF10: 0-NO PAIN

## 2025-06-25 NOTE — ASSESSMENT & PLAN NOTE
Orders:    Thyroxine, Free    Thyroid Stimulating Hormone    CBC    Comprehensive Metabolic Panel

## 2025-06-25 NOTE — PROGRESS NOTES
Subjective   Reason for Visit: Shira Curry is an 89 y.o. female here for a Medicare Wellness visit.          Presents for Medicare annual wellness exam. Weight is up 4 pounds. No acute issues. Doing well at her assisted living place. Seems to be happy and is eating better now.         Patient Care Team:  Scarlet Newell MD as PCP - General  Scarlet Newell MD as PCP - [facility] Medicaid PCP     Review of Systems   Constitutional:  Negative for chills and fever.   Respiratory:  Negative for shortness of breath.    Cardiovascular:  Negative for chest pain.   Gastrointestinal:  Negative for abdominal pain, nausea and vomiting.       Objective   Vitals:  There were no vitals taken for this visit.      Physical Exam  Gen: well groomed in NAD  A & O: alert and oriented x 3  HEENT: PEERL, EOMI, TM's clear bilaterally, no carotid bruits, no palpable thyroid nodules  CV: RRR  Lungs: CTA bilaterally  Abd: soft, NT/ND  Extr: no edema  Breasts: no palpable nodules or nipple discharge  Neuro: CN 2-12 intact, motor 5/5, sensation intact  Pelvic: deferred to Gyn   Assessment & Plan  Routine general medical examination at health care facility    Orders:    1 Year Follow Up In Primary Care - Wellness Exam; Future    Has aged out of Mamm and colon    Primary hypertension    Orders:    Lipid Panel    Comprehensive Metabolic Panel    Hypothyroidism, unspecified type    Orders:    Thyroxine, Free    Thyroid Stimulating Hormone    CBC    Comprehensive Metabolic Panel    Low vitamin D level    Orders:    Vitamin D 25-Hydroxy,Total (for eval of Vitamin D levels)    Persistent depressive disorder  Cont med        Depression Screening  5 - 10 minutes were spent screening for depression.    RTO Nov    Greater than 40 minutes were spent on the care and coordination of care of the patient.

## 2025-06-26 LAB
25(OH)D3+25(OH)D2 SERPL-MCNC: 18 NG/ML (ref 30–100)
ALBUMIN SERPL-MCNC: 4 G/DL (ref 3.6–5.1)
ALP SERPL-CCNC: 59 U/L (ref 37–153)
ALT SERPL-CCNC: 5 U/L (ref 6–29)
ANION GAP SERPL CALCULATED.4IONS-SCNC: 10 MMOL/L (CALC) (ref 7–17)
AST SERPL-CCNC: 12 U/L (ref 10–35)
BILIRUB SERPL-MCNC: 0.5 MG/DL (ref 0.2–1.2)
BUN SERPL-MCNC: 13 MG/DL (ref 7–25)
CALCIUM SERPL-MCNC: 10.4 MG/DL (ref 8.6–10.4)
CHLORIDE SERPL-SCNC: 93 MMOL/L (ref 98–110)
CHOLEST SERPL-MCNC: 158 MG/DL
CHOLEST/HDLC SERPL: 3.1 (CALC)
CO2 SERPL-SCNC: 30 MMOL/L (ref 20–32)
CREAT SERPL-MCNC: 0.86 MG/DL (ref 0.6–0.95)
EGFRCR SERPLBLD CKD-EPI 2021: 65 ML/MIN/1.73M2
ERYTHROCYTE [DISTWIDTH] IN BLOOD BY AUTOMATED COUNT: 13.5 % (ref 11–15)
GLUCOSE SERPL-MCNC: 100 MG/DL (ref 65–99)
HCT VFR BLD AUTO: 37.6 % (ref 35–45)
HDLC SERPL-MCNC: 51 MG/DL
HGB BLD-MCNC: 12.1 G/DL (ref 11.7–15.5)
LDLC SERPL CALC-MCNC: 91 MG/DL (CALC)
MCH RBC QN AUTO: 29.7 PG (ref 27–33)
MCHC RBC AUTO-ENTMCNC: 32.2 G/DL (ref 32–36)
MCV RBC AUTO: 92.2 FL (ref 80–100)
NONHDLC SERPL-MCNC: 107 MG/DL (CALC)
PLATELET # BLD AUTO: 321 THOUSAND/UL (ref 140–400)
PMV BLD REES-ECKER: 8.6 FL (ref 7.5–12.5)
POTASSIUM SERPL-SCNC: 3.2 MMOL/L (ref 3.5–5.3)
PROT SERPL-MCNC: 7.5 G/DL (ref 6.1–8.1)
RBC # BLD AUTO: 4.08 MILLION/UL (ref 3.8–5.1)
SODIUM SERPL-SCNC: 133 MMOL/L (ref 135–146)
T4 FREE SERPL-MCNC: 1.7 NG/DL (ref 0.8–1.8)
TRIGL SERPL-MCNC: 72 MG/DL
TSH SERPL-ACNC: 0.67 MIU/L (ref 0.4–4.5)
WBC # BLD AUTO: 3.5 THOUSAND/UL (ref 3.8–10.8)

## 2025-07-01 DIAGNOSIS — E87.6 HYPOKALEMIA: ICD-10-CM

## 2025-07-01 DIAGNOSIS — R79.89 LOW VITAMIN D LEVEL: Primary | ICD-10-CM

## 2025-07-01 RX ORDER — POTASSIUM CHLORIDE 20 MEQ/1
20 TABLET, EXTENDED RELEASE ORAL DAILY
Qty: 4 TABLET | Refills: 0 | Status: SHIPPED | OUTPATIENT
Start: 2025-07-01 | End: 2025-07-05

## 2025-07-01 RX ORDER — ERGOCALCIFEROL 1.25 MG/1
1.25 CAPSULE ORAL WEEKLY
Qty: 12 CAPSULE | Refills: 0 | Status: SHIPPED | OUTPATIENT
Start: 2025-07-01 | End: 2025-09-23

## 2025-07-13 ENCOUNTER — APPOINTMENT (OUTPATIENT)
Dept: RADIOLOGY | Facility: HOSPITAL | Age: 89
End: 2025-07-13
Payer: COMMERCIAL

## 2025-07-13 ENCOUNTER — HOSPITAL ENCOUNTER (INPATIENT)
Facility: HOSPITAL | Age: 89
LOS: 3 days | Discharge: NURSING, REHAB OR LONG-TERM CARE | End: 2025-07-16
Attending: STUDENT IN AN ORGANIZED HEALTH CARE EDUCATION/TRAINING PROGRAM | Admitting: INTERNAL MEDICINE
Payer: COMMERCIAL

## 2025-07-13 DIAGNOSIS — E87.6 HYPOKALEMIA: ICD-10-CM

## 2025-07-13 DIAGNOSIS — S72.002A CLOSED FRACTURE OF NECK OF LEFT FEMUR, INITIAL ENCOUNTER: Primary | ICD-10-CM

## 2025-07-13 LAB
ABO GROUP (TYPE) IN BLOOD: NORMAL
ANION GAP SERPL CALC-SCNC: 19 MMOL/L (ref 10–20)
ANTIBODY SCREEN: NORMAL
BASOPHILS # BLD AUTO: 0.03 X10*3/UL (ref 0–0.1)
BASOPHILS NFR BLD AUTO: 0.5 %
BUN SERPL-MCNC: 13 MG/DL (ref 6–23)
CALCIUM SERPL-MCNC: 10.2 MG/DL (ref 8.6–10.3)
CHLORIDE SERPL-SCNC: 92 MMOL/L (ref 98–107)
CO2 SERPL-SCNC: 22 MMOL/L (ref 21–32)
CREAT SERPL-MCNC: 0.8 MG/DL (ref 0.5–1.05)
EGFRCR SERPLBLD CKD-EPI 2021: 71 ML/MIN/1.73M*2
EOSINOPHIL # BLD AUTO: 0.07 X10*3/UL (ref 0–0.4)
EOSINOPHIL NFR BLD AUTO: 1.1 %
ERYTHROCYTE [DISTWIDTH] IN BLOOD BY AUTOMATED COUNT: 15.2 % (ref 11.5–14.5)
GLUCOSE SERPL-MCNC: 116 MG/DL (ref 74–99)
HCT VFR BLD AUTO: 35.2 % (ref 36–46)
HGB BLD-MCNC: 11.8 G/DL (ref 12–16)
IMM GRANULOCYTES # BLD AUTO: 0.05 X10*3/UL (ref 0–0.5)
IMM GRANULOCYTES NFR BLD AUTO: 0.8 % (ref 0–0.9)
INR PPP: 1.2 (ref 0.9–1.1)
LYMPHOCYTES # BLD AUTO: 0.94 X10*3/UL (ref 0.8–3)
LYMPHOCYTES NFR BLD AUTO: 14.2 %
MCH RBC QN AUTO: 28.9 PG (ref 26–34)
MCHC RBC AUTO-ENTMCNC: 33.5 G/DL (ref 32–36)
MCV RBC AUTO: 86 FL (ref 80–100)
MONOCYTES # BLD AUTO: 0.47 X10*3/UL (ref 0.05–0.8)
MONOCYTES NFR BLD AUTO: 7.1 %
NEUTROPHILS # BLD AUTO: 5.06 X10*3/UL (ref 1.6–5.5)
NEUTROPHILS NFR BLD AUTO: 76.3 %
NRBC BLD-RTO: 0 /100 WBCS (ref 0–0)
PLATELET # BLD AUTO: 272 X10*3/UL (ref 150–450)
POTASSIUM SERPL-SCNC: 2.5 MMOL/L (ref 3.5–5.3)
POTASSIUM SERPL-SCNC: 3.2 MMOL/L (ref 3.5–5.3)
PROTHROMBIN TIME: 13 SECONDS (ref 9.8–12.4)
RBC # BLD AUTO: 4.09 X10*6/UL (ref 4–5.2)
RH FACTOR (ANTIGEN D): NORMAL
SODIUM SERPL-SCNC: 130 MMOL/L (ref 136–145)
WBC # BLD AUTO: 6.6 X10*3/UL (ref 4.4–11.3)

## 2025-07-13 PROCEDURE — 73552 X-RAY EXAM OF FEMUR 2/>: CPT | Mod: LT

## 2025-07-13 PROCEDURE — 73552 X-RAY EXAM OF FEMUR 2/>: CPT | Mod: LEFT SIDE | Performed by: RADIOLOGY

## 2025-07-13 PROCEDURE — 86850 RBC ANTIBODY SCREEN: CPT | Performed by: STUDENT IN AN ORGANIZED HEALTH CARE EDUCATION/TRAINING PROGRAM

## 2025-07-13 PROCEDURE — 83735 ASSAY OF MAGNESIUM: CPT | Performed by: INTERNAL MEDICINE

## 2025-07-13 PROCEDURE — 73502 X-RAY EXAM HIP UNI 2-3 VIEWS: CPT | Mod: LT

## 2025-07-13 PROCEDURE — 2500000001 HC RX 250 WO HCPCS SELF ADMINISTERED DRUGS (ALT 637 FOR MEDICARE OP): Performed by: STUDENT IN AN ORGANIZED HEALTH CARE EDUCATION/TRAINING PROGRAM

## 2025-07-13 PROCEDURE — 85025 COMPLETE CBC W/AUTO DIFF WBC: CPT | Performed by: STUDENT IN AN ORGANIZED HEALTH CARE EDUCATION/TRAINING PROGRAM

## 2025-07-13 PROCEDURE — 84132 ASSAY OF SERUM POTASSIUM: CPT | Performed by: STUDENT IN AN ORGANIZED HEALTH CARE EDUCATION/TRAINING PROGRAM

## 2025-07-13 PROCEDURE — 1200000002 HC GENERAL ROOM WITH TELEMETRY DAILY

## 2025-07-13 PROCEDURE — 73502 X-RAY EXAM HIP UNI 2-3 VIEWS: CPT | Mod: LEFT SIDE | Performed by: RADIOLOGY

## 2025-07-13 PROCEDURE — 73560 X-RAY EXAM OF KNEE 1 OR 2: CPT | Mod: LEFT SIDE | Performed by: RADIOLOGY

## 2025-07-13 PROCEDURE — 85610 PROTHROMBIN TIME: CPT | Performed by: STUDENT IN AN ORGANIZED HEALTH CARE EDUCATION/TRAINING PROGRAM

## 2025-07-13 PROCEDURE — 99285 EMERGENCY DEPT VISIT HI MDM: CPT | Performed by: STUDENT IN AN ORGANIZED HEALTH CARE EDUCATION/TRAINING PROGRAM

## 2025-07-13 PROCEDURE — 36415 COLL VENOUS BLD VENIPUNCTURE: CPT | Performed by: STUDENT IN AN ORGANIZED HEALTH CARE EDUCATION/TRAINING PROGRAM

## 2025-07-13 PROCEDURE — 71045 X-RAY EXAM CHEST 1 VIEW: CPT

## 2025-07-13 PROCEDURE — 71045 X-RAY EXAM CHEST 1 VIEW: CPT | Performed by: RADIOLOGY

## 2025-07-13 PROCEDURE — 73560 X-RAY EXAM OF KNEE 1 OR 2: CPT | Mod: LT

## 2025-07-13 RX ORDER — HEPARIN SODIUM 5000 [USP'U]/ML
5000 INJECTION, SOLUTION INTRAVENOUS; SUBCUTANEOUS EVERY 8 HOURS
Status: DISCONTINUED | OUTPATIENT
Start: 2025-07-13 | End: 2025-07-14

## 2025-07-13 RX ORDER — ACETAMINOPHEN 325 MG/1
650 TABLET ORAL EVERY 4 HOURS PRN
Status: DISCONTINUED | OUTPATIENT
Start: 2025-07-13 | End: 2025-07-15

## 2025-07-13 RX ORDER — PANTOPRAZOLE SODIUM 40 MG/10ML
40 INJECTION, POWDER, LYOPHILIZED, FOR SOLUTION INTRAVENOUS
Status: DISCONTINUED | OUTPATIENT
Start: 2025-07-14 | End: 2025-07-15

## 2025-07-13 RX ORDER — MIRTAZAPINE 15 MG/1
30 TABLET, FILM COATED ORAL NIGHTLY
Status: DISCONTINUED | OUTPATIENT
Start: 2025-07-13 | End: 2025-07-16 | Stop reason: HOSPADM

## 2025-07-13 RX ORDER — ACETAMINOPHEN 650 MG/1
650 SUPPOSITORY RECTAL EVERY 4 HOURS PRN
Status: DISCONTINUED | OUTPATIENT
Start: 2025-07-13 | End: 2025-07-15

## 2025-07-13 RX ORDER — POTASSIUM CHLORIDE 1.5 G/1.58G
40 POWDER, FOR SOLUTION ORAL ONCE
Status: COMPLETED | OUTPATIENT
Start: 2025-07-13 | End: 2025-07-14

## 2025-07-13 RX ORDER — ACETAMINOPHEN 160 MG/5ML
650 SOLUTION ORAL EVERY 4 HOURS PRN
Status: DISCONTINUED | OUTPATIENT
Start: 2025-07-13 | End: 2025-07-15

## 2025-07-13 RX ORDER — HYDROMORPHONE HYDROCHLORIDE 0.2 MG/ML
0.2 INJECTION INTRAMUSCULAR; INTRAVENOUS; SUBCUTANEOUS EVERY 4 HOURS PRN
Status: DISCONTINUED | OUTPATIENT
Start: 2025-07-13 | End: 2025-07-15

## 2025-07-13 RX ORDER — SODIUM CHLORIDE, SODIUM LACTATE, POTASSIUM CHLORIDE, CALCIUM CHLORIDE 600; 310; 30; 20 MG/100ML; MG/100ML; MG/100ML; MG/100ML
75 INJECTION, SOLUTION INTRAVENOUS CONTINUOUS
Status: DISCONTINUED | OUTPATIENT
Start: 2025-07-13 | End: 2025-07-15

## 2025-07-13 RX ORDER — ONDANSETRON HYDROCHLORIDE 2 MG/ML
4 INJECTION, SOLUTION INTRAVENOUS EVERY 8 HOURS PRN
Status: DISCONTINUED | OUTPATIENT
Start: 2025-07-13 | End: 2025-07-16 | Stop reason: HOSPADM

## 2025-07-13 RX ORDER — LEVOTHYROXINE SODIUM 100 UG/1
100 TABLET ORAL DAILY
Status: DISCONTINUED | OUTPATIENT
Start: 2025-07-14 | End: 2025-07-16 | Stop reason: HOSPADM

## 2025-07-13 RX ORDER — ONDANSETRON 4 MG/1
4 TABLET, FILM COATED ORAL EVERY 8 HOURS PRN
Status: DISCONTINUED | OUTPATIENT
Start: 2025-07-13 | End: 2025-07-16 | Stop reason: HOSPADM

## 2025-07-13 RX ORDER — CLONAZEPAM 0.5 MG/1
0.5 TABLET ORAL NIGHTLY
Status: DISCONTINUED | OUTPATIENT
Start: 2025-07-13 | End: 2025-07-16 | Stop reason: HOSPADM

## 2025-07-13 RX ORDER — ACETAMINOPHEN 325 MG/1
975 TABLET ORAL ONCE
Status: COMPLETED | OUTPATIENT
Start: 2025-07-13 | End: 2025-07-13

## 2025-07-13 RX ORDER — PANTOPRAZOLE SODIUM 40 MG/1
40 TABLET, DELAYED RELEASE ORAL
Status: DISCONTINUED | OUTPATIENT
Start: 2025-07-14 | End: 2025-07-16 | Stop reason: HOSPADM

## 2025-07-13 RX ORDER — POTASSIUM CHLORIDE 14.9 MG/ML
20 INJECTION INTRAVENOUS
Status: COMPLETED | OUTPATIENT
Start: 2025-07-13 | End: 2025-07-14

## 2025-07-13 RX ADMIN — ACETAMINOPHEN 975 MG: 325 TABLET ORAL at 22:26

## 2025-07-13 ASSESSMENT — PAIN DESCRIPTION - LOCATION: LOCATION: HIP

## 2025-07-13 ASSESSMENT — ENCOUNTER SYMPTOMS
ALLERGIC/IMMUNOLOGIC NEGATIVE: 1
RESPIRATORY NEGATIVE: 1
HEMATOLOGIC/LYMPHATIC NEGATIVE: 1
CARDIOVASCULAR NEGATIVE: 1
GASTROINTESTINAL NEGATIVE: 1
ACTIVITY CHANGE: 1
ENDOCRINE NEGATIVE: 1
FATIGUE: 1
PSYCHIATRIC NEGATIVE: 1
EYES NEGATIVE: 1

## 2025-07-13 ASSESSMENT — PAIN - FUNCTIONAL ASSESSMENT: PAIN_FUNCTIONAL_ASSESSMENT: 0-10

## 2025-07-13 ASSESSMENT — PAIN SCALES - GENERAL: PAINLEVEL_OUTOF10: 5 - MODERATE PAIN

## 2025-07-13 ASSESSMENT — PAIN DESCRIPTION - ORIENTATION: ORIENTATION: LEFT

## 2025-07-14 ENCOUNTER — APPOINTMENT (OUTPATIENT)
Dept: RADIOLOGY | Facility: HOSPITAL | Age: 89
End: 2025-07-14
Payer: COMMERCIAL

## 2025-07-14 ENCOUNTER — ANESTHESIA (OUTPATIENT)
Dept: OPERATING ROOM | Facility: HOSPITAL | Age: 89
End: 2025-07-14
Payer: COMMERCIAL

## 2025-07-14 ENCOUNTER — ANESTHESIA EVENT (OUTPATIENT)
Dept: OPERATING ROOM | Facility: HOSPITAL | Age: 89
End: 2025-07-14
Payer: COMMERCIAL

## 2025-07-14 ENCOUNTER — APPOINTMENT (OUTPATIENT)
Dept: CARDIOLOGY | Facility: HOSPITAL | Age: 89
End: 2025-07-14
Payer: COMMERCIAL

## 2025-07-14 LAB
ANION GAP SERPL CALC-SCNC: 13 MMOL/L (ref 10–20)
ANION GAP SERPL CALC-SCNC: 13 MMOL/L (ref 10–20)
BUN SERPL-MCNC: 11 MG/DL (ref 6–23)
BUN SERPL-MCNC: 13 MG/DL (ref 6–23)
CALCIUM SERPL-MCNC: 10 MG/DL (ref 8.6–10.3)
CALCIUM SERPL-MCNC: 10.2 MG/DL (ref 8.6–10.3)
CHLORIDE SERPL-SCNC: 93 MMOL/L (ref 98–107)
CHLORIDE SERPL-SCNC: 94 MMOL/L (ref 98–107)
CO2 SERPL-SCNC: 25 MMOL/L (ref 21–32)
CO2 SERPL-SCNC: 27 MMOL/L (ref 21–32)
CREAT SERPL-MCNC: 0.66 MG/DL (ref 0.5–1.05)
CREAT SERPL-MCNC: 0.69 MG/DL (ref 0.5–1.05)
EGFRCR SERPLBLD CKD-EPI 2021: 84 ML/MIN/1.73M*2
EGFRCR SERPLBLD CKD-EPI 2021: 85 ML/MIN/1.73M*2
ERYTHROCYTE [DISTWIDTH] IN BLOOD BY AUTOMATED COUNT: 14.5 % (ref 11.5–14.5)
GLUCOSE SERPL-MCNC: 127 MG/DL (ref 74–99)
GLUCOSE SERPL-MCNC: 131 MG/DL (ref 74–99)
HCT VFR BLD AUTO: 34.8 % (ref 36–46)
HGB BLD-MCNC: 12.2 G/DL (ref 12–16)
MAGNESIUM SERPL-MCNC: 1.99 MG/DL (ref 1.6–2.4)
MCH RBC QN AUTO: 29 PG (ref 26–34)
MCHC RBC AUTO-ENTMCNC: 35.1 G/DL (ref 32–36)
MCV RBC AUTO: 83 FL (ref 80–100)
NRBC BLD-RTO: 0 /100 WBCS (ref 0–0)
PLATELET # BLD AUTO: 253 X10*3/UL (ref 150–450)
POTASSIUM SERPL-SCNC: 3.1 MMOL/L (ref 3.5–5.3)
POTASSIUM SERPL-SCNC: 4.2 MMOL/L (ref 3.5–5.3)
RBC # BLD AUTO: 4.21 X10*6/UL (ref 4–5.2)
SODIUM SERPL-SCNC: 128 MMOL/L (ref 136–145)
SODIUM SERPL-SCNC: 130 MMOL/L (ref 136–145)
WBC # BLD AUTO: 7.7 X10*3/UL (ref 4.4–11.3)

## 2025-07-14 PROCEDURE — 36415 COLL VENOUS BLD VENIPUNCTURE: CPT | Performed by: HOSPITALIST

## 2025-07-14 PROCEDURE — 72170 X-RAY EXAM OF PELVIS: CPT

## 2025-07-14 PROCEDURE — 2720000007 HC OR 272 NO HCPCS: Performed by: ORTHOPAEDIC SURGERY

## 2025-07-14 PROCEDURE — 2780000003 HC OR 278 NO HCPCS: Performed by: ORTHOPAEDIC SURGERY

## 2025-07-14 PROCEDURE — 3700000002 HC GENERAL ANESTHESIA TIME - EACH INCREMENTAL 1 MINUTE: Performed by: ORTHOPAEDIC SURGERY

## 2025-07-14 PROCEDURE — 2500000001 HC RX 250 WO HCPCS SELF ADMINISTERED DRUGS (ALT 637 FOR MEDICARE OP): Performed by: HOSPITALIST

## 2025-07-14 PROCEDURE — 80048 BASIC METABOLIC PNL TOTAL CA: CPT | Performed by: HOSPITALIST

## 2025-07-14 PROCEDURE — 3600000010 HC OR TIME - EACH INCREMENTAL 1 MINUTE - PROCEDURE LEVEL FIVE: Performed by: ORTHOPAEDIC SURGERY

## 2025-07-14 PROCEDURE — 36415 COLL VENOUS BLD VENIPUNCTURE: CPT | Performed by: INTERNAL MEDICINE

## 2025-07-14 PROCEDURE — 7100000001 HC RECOVERY ROOM TIME - INITIAL BASE CHARGE: Performed by: ORTHOPAEDIC SURGERY

## 2025-07-14 PROCEDURE — 3600000005 HC OR TIME - INITIAL BASE CHARGE - PROCEDURE LEVEL FIVE: Performed by: ORTHOPAEDIC SURGERY

## 2025-07-14 PROCEDURE — 1200000002 HC GENERAL ROOM WITH TELEMETRY DAILY

## 2025-07-14 PROCEDURE — 80048 BASIC METABOLIC PNL TOTAL CA: CPT | Performed by: INTERNAL MEDICINE

## 2025-07-14 PROCEDURE — 2500000005 HC RX 250 GENERAL PHARMACY W/O HCPCS: Performed by: ANESTHESIOLOGIST ASSISTANT

## 2025-07-14 PROCEDURE — 99222 1ST HOSP IP/OBS MODERATE 55: CPT

## 2025-07-14 PROCEDURE — 85027 COMPLETE CBC AUTOMATED: CPT | Performed by: INTERNAL MEDICINE

## 2025-07-14 PROCEDURE — 0SRS01Z REPLACEMENT OF LEFT HIP JOINT, FEMORAL SURFACE WITH METAL SYNTHETIC SUBSTITUTE, OPEN APPROACH: ICD-10-PCS | Performed by: ORTHOPAEDIC SURGERY

## 2025-07-14 PROCEDURE — 93010 ELECTROCARDIOGRAM REPORT: CPT | Performed by: INTERNAL MEDICINE

## 2025-07-14 PROCEDURE — C1776 JOINT DEVICE (IMPLANTABLE): HCPCS | Performed by: ORTHOPAEDIC SURGERY

## 2025-07-14 PROCEDURE — 2500000004 HC RX 250 GENERAL PHARMACY W/ HCPCS (ALT 636 FOR OP/ED): Performed by: INTERNAL MEDICINE

## 2025-07-14 PROCEDURE — 2500000004 HC RX 250 GENERAL PHARMACY W/ HCPCS (ALT 636 FOR OP/ED): Performed by: ANESTHESIOLOGIST ASSISTANT

## 2025-07-14 PROCEDURE — 83930 ASSAY OF BLOOD OSMOLALITY: CPT | Mod: AHULAB | Performed by: HOSPITALIST

## 2025-07-14 PROCEDURE — 2500000001 HC RX 250 WO HCPCS SELF ADMINISTERED DRUGS (ALT 637 FOR MEDICARE OP)

## 2025-07-14 PROCEDURE — 7100000002 HC RECOVERY ROOM TIME - EACH INCREMENTAL 1 MINUTE: Performed by: ORTHOPAEDIC SURGERY

## 2025-07-14 PROCEDURE — 72170 X-RAY EXAM OF PELVIS: CPT | Performed by: RADIOLOGY

## 2025-07-14 PROCEDURE — 3700000001 HC GENERAL ANESTHESIA TIME - INITIAL BASE CHARGE: Performed by: ORTHOPAEDIC SURGERY

## 2025-07-14 PROCEDURE — 2500000001 HC RX 250 WO HCPCS SELF ADMINISTERED DRUGS (ALT 637 FOR MEDICARE OP): Performed by: INTERNAL MEDICINE

## 2025-07-14 PROCEDURE — 2500000004 HC RX 250 GENERAL PHARMACY W/ HCPCS (ALT 636 FOR OP/ED)

## 2025-07-14 PROCEDURE — 99232 SBSQ HOSP IP/OBS MODERATE 35: CPT | Performed by: HOSPITALIST

## 2025-07-14 PROCEDURE — 27236 TREAT THIGH FRACTURE: CPT | Performed by: ORTHOPAEDIC SURGERY

## 2025-07-14 PROCEDURE — 93005 ELECTROCARDIOGRAM TRACING: CPT

## 2025-07-14 PROCEDURE — 2500000005 HC RX 250 GENERAL PHARMACY W/O HCPCS: Performed by: ORTHOPAEDIC SURGERY

## 2025-07-14 DEVICE — ARTICUL/EZE FEMORAL HEAD DIAMETER 28MM +1.5 12/14 TAPER
Type: IMPLANTABLE DEVICE | Site: HIP | Status: FUNCTIONAL
Brand: ARTICUL/EZE

## 2025-07-14 DEVICE — ACTIS DUOFIX HIP PROSTHESIS (FEMORAL STEM 12/14 TAPER CEMENTLESS SIZE 6 STD COLLAR)  CE
Type: IMPLANTABLE DEVICE | Site: HIP | Status: FUNCTIONAL
Brand: ACTIS

## 2025-07-14 DEVICE — IMPLANTABLE DEVICE: Type: IMPLANTABLE DEVICE | Site: HIP | Status: FUNCTIONAL

## 2025-07-14 RX ORDER — TRANEXAMIC ACID 1 G/10ML
INJECTION, SOLUTION INTRAVENOUS AS NEEDED
Status: DISCONTINUED | OUTPATIENT
Start: 2025-07-14 | End: 2025-07-14

## 2025-07-14 RX ORDER — ESMOLOL HYDROCHLORIDE 10 MG/ML
INJECTION INTRAVENOUS AS NEEDED
Status: DISCONTINUED | OUTPATIENT
Start: 2025-07-14 | End: 2025-07-14

## 2025-07-14 RX ORDER — SODIUM CHLORIDE 0.9 G/100ML
INJECTION, SOLUTION IRRIGATION AS NEEDED
Status: DISCONTINUED | OUTPATIENT
Start: 2025-07-14 | End: 2025-07-14 | Stop reason: HOSPADM

## 2025-07-14 RX ORDER — OXYCODONE HYDROCHLORIDE 5 MG/1
5 TABLET ORAL EVERY 4 HOURS PRN
Status: DISCONTINUED | OUTPATIENT
Start: 2025-07-14 | End: 2025-07-14 | Stop reason: HOSPADM

## 2025-07-14 RX ORDER — PROPOFOL 10 MG/ML
INJECTION, EMULSION INTRAVENOUS AS NEEDED
Status: DISCONTINUED | OUTPATIENT
Start: 2025-07-14 | End: 2025-07-14

## 2025-07-14 RX ORDER — ALBUTEROL SULFATE 0.83 MG/ML
2.5 SOLUTION RESPIRATORY (INHALATION) ONCE AS NEEDED
Status: DISCONTINUED | OUTPATIENT
Start: 2025-07-14 | End: 2025-07-14 | Stop reason: HOSPADM

## 2025-07-14 RX ORDER — PHENYLEPHRINE HCL IN 0.9% NACL 1 MG/10 ML
SYRINGE (ML) INTRAVENOUS AS NEEDED
Status: DISCONTINUED | OUTPATIENT
Start: 2025-07-14 | End: 2025-07-14

## 2025-07-14 RX ORDER — DIPHENHYDRAMINE HYDROCHLORIDE 50 MG/ML
25 INJECTION, SOLUTION INTRAMUSCULAR; INTRAVENOUS ONCE AS NEEDED
Status: DISCONTINUED | OUTPATIENT
Start: 2025-07-14 | End: 2025-07-14 | Stop reason: HOSPADM

## 2025-07-14 RX ORDER — ONDANSETRON HYDROCHLORIDE 2 MG/ML
4 INJECTION, SOLUTION INTRAVENOUS ONCE AS NEEDED
Status: DISCONTINUED | OUTPATIENT
Start: 2025-07-14 | End: 2025-07-14 | Stop reason: HOSPADM

## 2025-07-14 RX ORDER — CEFAZOLIN 1 G/1
INJECTION, POWDER, FOR SOLUTION INTRAVENOUS AS NEEDED
Status: DISCONTINUED | OUTPATIENT
Start: 2025-07-14 | End: 2025-07-14

## 2025-07-14 RX ORDER — LIDOCAINE HYDROCHLORIDE 20 MG/ML
INJECTION, SOLUTION INFILTRATION; PERINEURAL AS NEEDED
Status: DISCONTINUED | OUTPATIENT
Start: 2025-07-14 | End: 2025-07-14

## 2025-07-14 RX ORDER — FENTANYL CITRATE 50 UG/ML
INJECTION, SOLUTION INTRAMUSCULAR; INTRAVENOUS AS NEEDED
Status: DISCONTINUED | OUTPATIENT
Start: 2025-07-14 | End: 2025-07-14

## 2025-07-14 RX ORDER — CEFAZOLIN SODIUM 2 G/50ML
2 SOLUTION INTRAVENOUS EVERY 8 HOURS
Status: COMPLETED | OUTPATIENT
Start: 2025-07-14 | End: 2025-07-15

## 2025-07-14 RX ORDER — ROPIVACAINE/EPI/CLONIDINE/KET 2.46-0.005
SYRINGE (ML) INJECTION AS NEEDED
Status: DISCONTINUED | OUTPATIENT
Start: 2025-07-14 | End: 2025-07-14 | Stop reason: HOSPADM

## 2025-07-14 RX ORDER — ASPIRIN 81 MG/1
81 TABLET ORAL 2 TIMES DAILY
Status: DISCONTINUED | OUTPATIENT
Start: 2025-07-14 | End: 2025-07-16 | Stop reason: HOSPADM

## 2025-07-14 RX ORDER — ROCURONIUM BROMIDE 10 MG/ML
INJECTION, SOLUTION INTRAVENOUS AS NEEDED
Status: DISCONTINUED | OUTPATIENT
Start: 2025-07-14 | End: 2025-07-14

## 2025-07-14 RX ORDER — HYDROMORPHONE HYDROCHLORIDE 1 MG/ML
INJECTION, SOLUTION INTRAMUSCULAR; INTRAVENOUS; SUBCUTANEOUS AS NEEDED
Status: DISCONTINUED | OUTPATIENT
Start: 2025-07-14 | End: 2025-07-14

## 2025-07-14 RX ORDER — ACETAMINOPHEN 325 MG/1
650 TABLET ORAL EVERY 4 HOURS PRN
Status: DISCONTINUED | OUTPATIENT
Start: 2025-07-14 | End: 2025-07-14 | Stop reason: HOSPADM

## 2025-07-14 RX ORDER — DROPERIDOL 2.5 MG/ML
0.62 INJECTION, SOLUTION INTRAMUSCULAR; INTRAVENOUS ONCE AS NEEDED
Status: DISCONTINUED | OUTPATIENT
Start: 2025-07-14 | End: 2025-07-14 | Stop reason: HOSPADM

## 2025-07-14 RX ORDER — GLYCOPYRROLATE 0.2 MG/ML
INJECTION INTRAMUSCULAR; INTRAVENOUS AS NEEDED
Status: DISCONTINUED | OUTPATIENT
Start: 2025-07-14 | End: 2025-07-14

## 2025-07-14 RX ADMIN — POTASSIUM CHLORIDE 20 MEQ: 14.9 INJECTION, SOLUTION INTRAVENOUS at 00:21

## 2025-07-14 RX ADMIN — TRANEXAMIC ACID 1000 MG: 1 INJECTION, SOLUTION INTRAVENOUS at 14:06

## 2025-07-14 RX ADMIN — ONDANSETRON 4 MG: 2 INJECTION INTRAMUSCULAR; INTRAVENOUS at 14:37

## 2025-07-14 RX ADMIN — CARBOXYMETHYLCELLULOSE SODIUM 2 DROP: 5 SOLUTION/ DROPS OPHTHALMIC at 13:44

## 2025-07-14 RX ADMIN — Medication 100 MCG: at 14:32

## 2025-07-14 RX ADMIN — MIRTAZAPINE 30 MG: 15 TABLET, FILM COATED ORAL at 20:53

## 2025-07-14 RX ADMIN — POTASSIUM CHLORIDE 20 MEQ: 14.9 INJECTION, SOLUTION INTRAVENOUS at 01:59

## 2025-07-14 RX ADMIN — CEFAZOLIN 2 G: 1 INJECTION, POWDER, FOR SOLUTION INTRAMUSCULAR; INTRAVENOUS at 13:52

## 2025-07-14 RX ADMIN — ASPIRIN 81 MG: 81 TABLET, COATED ORAL at 20:53

## 2025-07-14 RX ADMIN — FENTANYL CITRATE 50 MCG: 50 INJECTION, SOLUTION INTRAMUSCULAR; INTRAVENOUS at 14:16

## 2025-07-14 RX ADMIN — HYDROMORPHONE HYDROCHLORIDE 0.2 MG: 1 INJECTION, SOLUTION INTRAMUSCULAR; INTRAVENOUS; SUBCUTANEOUS at 14:39

## 2025-07-14 RX ADMIN — FENTANYL CITRATE 25 MCG: 50 INJECTION, SOLUTION INTRAMUSCULAR; INTRAVENOUS at 14:08

## 2025-07-14 RX ADMIN — SUGAMMADEX 200 MG: 100 INJECTION, SOLUTION INTRAVENOUS at 15:08

## 2025-07-14 RX ADMIN — DEXAMETHASONE SODIUM PHOSPHATE 8 MG: 4 INJECTION, SOLUTION INTRA-ARTICULAR; INTRALESIONAL; INTRAMUSCULAR; INTRAVENOUS; SOFT TISSUE at 14:12

## 2025-07-14 RX ADMIN — POTASSIUM CHLORIDE 40 MEQ: 1.5 POWDER, FOR SOLUTION ORAL at 00:21

## 2025-07-14 RX ADMIN — FENTANYL CITRATE 25 MCG: 50 INJECTION, SOLUTION INTRAMUSCULAR; INTRAVENOUS at 13:44

## 2025-07-14 RX ADMIN — PROPOFOL 70 MG: 10 INJECTION, EMULSION INTRAVENOUS at 13:44

## 2025-07-14 RX ADMIN — SODIUM CHLORIDE, SODIUM LACTATE, POTASSIUM CHLORIDE, AND CALCIUM CHLORIDE 75 ML/HR: .6; .31; .03; .02 INJECTION, SOLUTION INTRAVENOUS at 00:21

## 2025-07-14 RX ADMIN — SODIUM CHLORIDE, SODIUM LACTATE, POTASSIUM CHLORIDE, AND CALCIUM CHLORIDE: .6; .31; .03; .02 INJECTION, SOLUTION INTRAVENOUS at 15:03

## 2025-07-14 RX ADMIN — CEFAZOLIN SODIUM 2 G: 2 SOLUTION INTRAVENOUS at 21:01

## 2025-07-14 RX ADMIN — GLYCOPYRROLATE 0.1 MG: 0.2 INJECTION INTRAMUSCULAR; INTRAVENOUS at 14:12

## 2025-07-14 RX ADMIN — CLONAZEPAM 0.5 MG: 0.5 TABLET ORAL at 20:53

## 2025-07-14 RX ADMIN — ESMOLOL HYDROCHLORIDE 40 MG: 10 INJECTION, SOLUTION INTRAVENOUS at 15:10

## 2025-07-14 RX ADMIN — ROCURONIUM BROMIDE 50 MG: 10 INJECTION, SOLUTION INTRAVENOUS at 13:44

## 2025-07-14 RX ADMIN — LIDOCAINE HYDROCHLORIDE 40 MG: 20 INJECTION, SOLUTION INFILTRATION; PERINEURAL at 13:44

## 2025-07-14 SDOH — ECONOMIC STABILITY: FOOD INSECURITY: WITHIN THE PAST 12 MONTHS, THE FOOD YOU BOUGHT JUST DIDN'T LAST AND YOU DIDN'T HAVE MONEY TO GET MORE.: NEVER TRUE

## 2025-07-14 SDOH — ECONOMIC STABILITY: INCOME INSECURITY: IN THE PAST 12 MONTHS HAS THE ELECTRIC, GAS, OIL, OR WATER COMPANY THREATENED TO SHUT OFF SERVICES IN YOUR HOME?: NO

## 2025-07-14 SDOH — SOCIAL STABILITY: SOCIAL INSECURITY: HAVE YOU HAD THOUGHTS OF HARMING ANYONE ELSE?: NO

## 2025-07-14 SDOH — SOCIAL STABILITY: SOCIAL INSECURITY: WITHIN THE LAST YEAR, HAVE YOU BEEN AFRAID OF YOUR PARTNER OR EX-PARTNER?: NO

## 2025-07-14 SDOH — SOCIAL STABILITY: SOCIAL INSECURITY
WITHIN THE LAST YEAR, HAVE YOU BEEN KICKED, HIT, SLAPPED, OR OTHERWISE PHYSICALLY HURT BY YOUR PARTNER OR EX-PARTNER?: NO

## 2025-07-14 SDOH — SOCIAL STABILITY: SOCIAL INSECURITY: DO YOU FEEL ANYONE HAS EXPLOITED OR TAKEN ADVANTAGE OF YOU FINANCIALLY OR OF YOUR PERSONAL PROPERTY?: NO

## 2025-07-14 SDOH — ECONOMIC STABILITY: FOOD INSECURITY: WITHIN THE PAST 12 MONTHS, YOU WORRIED THAT YOUR FOOD WOULD RUN OUT BEFORE YOU GOT THE MONEY TO BUY MORE.: NEVER TRUE

## 2025-07-14 SDOH — SOCIAL STABILITY: SOCIAL INSECURITY: WERE YOU ABLE TO COMPLETE ALL THE BEHAVIORAL HEALTH SCREENINGS?: YES

## 2025-07-14 SDOH — SOCIAL STABILITY: SOCIAL INSECURITY: ABUSE: ADULT

## 2025-07-14 SDOH — SOCIAL STABILITY: SOCIAL INSECURITY
WITHIN THE LAST YEAR, HAVE YOU BEEN RAPED OR FORCED TO HAVE ANY KIND OF SEXUAL ACTIVITY BY YOUR PARTNER OR EX-PARTNER?: NO

## 2025-07-14 SDOH — SOCIAL STABILITY: SOCIAL INSECURITY: WITHIN THE LAST YEAR, HAVE YOU BEEN HUMILIATED OR EMOTIONALLY ABUSED IN OTHER WAYS BY YOUR PARTNER OR EX-PARTNER?: NO

## 2025-07-14 SDOH — SOCIAL STABILITY: SOCIAL INSECURITY: HAS ANYONE EVER THREATENED TO HURT YOUR FAMILY OR YOUR PETS?: NO

## 2025-07-14 SDOH — SOCIAL STABILITY: SOCIAL INSECURITY: ARE THERE ANY APPARENT SIGNS OF INJURIES/BEHAVIORS THAT COULD BE RELATED TO ABUSE/NEGLECT?: NO

## 2025-07-14 SDOH — SOCIAL STABILITY: SOCIAL INSECURITY: DOES ANYONE TRY TO KEEP YOU FROM HAVING/CONTACTING OTHER FRIENDS OR DOING THINGS OUTSIDE YOUR HOME?: NO

## 2025-07-14 SDOH — SOCIAL STABILITY: SOCIAL INSECURITY: ARE YOU OR HAVE YOU BEEN THREATENED OR ABUSED PHYSICALLY, EMOTIONALLY, OR SEXUALLY BY ANYONE?: NO

## 2025-07-14 SDOH — SOCIAL STABILITY: SOCIAL INSECURITY: DO YOU FEEL UNSAFE GOING BACK TO THE PLACE WHERE YOU ARE LIVING?: NO

## 2025-07-14 SDOH — SOCIAL STABILITY: SOCIAL INSECURITY: HAVE YOU HAD ANY THOUGHTS OF HARMING ANYONE ELSE?: NO

## 2025-07-14 ASSESSMENT — COGNITIVE AND FUNCTIONAL STATUS - GENERAL
DAILY ACTIVITIY SCORE: 20
PATIENT BASELINE BEDBOUND: NO
MOBILITY SCORE: 10
WALKING IN HOSPITAL ROOM: TOTAL
CLIMB 3 TO 5 STEPS WITH RAILING: TOTAL
TOILETING: A LITTLE
MOVING TO AND FROM BED TO CHAIR: A LOT
MOVING FROM LYING ON BACK TO SITTING ON SIDE OF FLAT BED WITH BEDRAILS: A LOT
HELP NEEDED FOR BATHING: A LITTLE
TURNING FROM BACK TO SIDE WHILE IN FLAT BAD: A LOT
STANDING UP FROM CHAIR USING ARMS: A LOT
DRESSING REGULAR LOWER BODY CLOTHING: A LOT

## 2025-07-14 ASSESSMENT — PAIN SCALES - GENERAL
PAINLEVEL_OUTOF10: 0 - NO PAIN
PAINLEVEL_OUTOF10: 0 - NO PAIN
PAINLEVEL_OUTOF10: 4
PAINLEVEL_OUTOF10: 0 - NO PAIN

## 2025-07-14 ASSESSMENT — ACTIVITIES OF DAILY LIVING (ADL)
JUDGMENT_ADEQUATE_SAFELY_COMPLETE_DAILY_ACTIVITIES: YES
WALKS IN HOME: NEEDS ASSISTANCE
GROOMING: INDEPENDENT
DRESSING YOURSELF: INDEPENDENT
LACK_OF_TRANSPORTATION: NO
BATHING: INDEPENDENT
HEARING - LEFT EAR: FUNCTIONAL
TOILETING: INDEPENDENT
FEEDING YOURSELF: INDEPENDENT
ASSISTIVE_DEVICE: WALKER;EYEGLASSES
LACK_OF_TRANSPORTATION: NO
PATIENT'S MEMORY ADEQUATE TO SAFELY COMPLETE DAILY ACTIVITIES?: YES
ADEQUATE_TO_COMPLETE_ADL: YES
HEARING - RIGHT EAR: FUNCTIONAL

## 2025-07-14 ASSESSMENT — PAIN - FUNCTIONAL ASSESSMENT
PAIN_FUNCTIONAL_ASSESSMENT: 0-10
PAIN_FUNCTIONAL_ASSESSMENT: PAINAD (PAIN ASSESSMENT IN ADVANCED DEMENTIA SCALE)

## 2025-07-14 ASSESSMENT — PATIENT HEALTH QUESTIONNAIRE - PHQ9
SUM OF ALL RESPONSES TO PHQ9 QUESTIONS 1 & 2: 0
1. LITTLE INTEREST OR PLEASURE IN DOING THINGS: NOT AT ALL
2. FEELING DOWN, DEPRESSED OR HOPELESS: NOT AT ALL

## 2025-07-14 ASSESSMENT — PAIN SCALES - PAIN ASSESSMENT IN ADVANCED DEMENTIA (PAINAD)
BREATHING: NORMAL
BODYLANGUAGE: RELAXED
TOTALSCORE: 0
CONSOLABILITY: NO NEED TO CONSOLE
FACIALEXPRESSION: SMILING OR INEXPRESSIVE

## 2025-07-14 ASSESSMENT — LIFESTYLE VARIABLES
HOW OFTEN DO YOU HAVE A DRINK CONTAINING ALCOHOL: NEVER
AUDIT-C TOTAL SCORE: 0
HOW OFTEN DO YOU HAVE 6 OR MORE DRINKS ON ONE OCCASION: NEVER
AUDIT-C TOTAL SCORE: 0
SKIP TO QUESTIONS 9-10: 1
HOW MANY STANDARD DRINKS CONTAINING ALCOHOL DO YOU HAVE ON A TYPICAL DAY: PATIENT DOES NOT DRINK

## 2025-07-14 ASSESSMENT — COLUMBIA-SUICIDE SEVERITY RATING SCALE - C-SSRS
1. IN THE PAST MONTH, HAVE YOU WISHED YOU WERE DEAD OR WISHED YOU COULD GO TO SLEEP AND NOT WAKE UP?: NO
6. HAVE YOU EVER DONE ANYTHING, STARTED TO DO ANYTHING, OR PREPARED TO DO ANYTHING TO END YOUR LIFE?: NO
2. HAVE YOU ACTUALLY HAD ANY THOUGHTS OF KILLING YOURSELF?: NO

## 2025-07-14 NOTE — ED PROVIDER NOTES
Emergency Department Provider Note        History of Present Illness     Chief Complaint: Fall and Hip Pain   History provided by: Patient  Limitations to History: None  External Chart Review: See ED Course    HPI:  Shira Curry is a 89 y.o. female presenting to the ED for fall and L hip pain.  Patient reports this evening she stood up from the couch and slipped, falling onto her left hip.  She reports continued pain in her left hip and upper leg since the fall.  Did not strike her head.  Denies LOC.  Normally ambulates with a rollator but had difficulty getting back up on her own and has not yet tried to bear weight on the extremity. Denies knee or ankle pain.    Physical Exam   Triage vitals:  T 36.6 °C (97.9 °F)  HR 67  /75  RR 16  O2 96 % None (Room air)    Constitutional: Awake, alert, not in acute distress  HENT: Head atraumatic, mucous membranes moist  Eyes:  Conjunctivae normal  Neck:  Supple  Lungs: Clear to auscultation, breath sounds equal and symmetric, no wheezes, rales, or rhonchi  Heart: Regular rate and rhythm, no murmur, rub or gallop  Abdomen: Soft, nontender, nondistended, no rebound or guarding  Extremities: No obvious deformity.  There is tenderness to the L hip and femur  .  There is no swelling, erythema, or warmth..  ROM is intact. 5/5 strength with hip flexion/extension, knee flexion/extension, dorsiflexion, plantarflexion. DP, PT pulses are 2+ bilaterally. Cap refill brisk. SILT throughout the left lower extremity.  Neuro: Alert, no focal deficit  Skin: Warm, dry  Psych: Calm, cooperative       Medical Decision Making & ED Course   Medical Decision Making:  Shira Curry is a 89 y.o. female with PMHx as above in HPI who presented to the ED for fall and L hip pain. Patient was afebrile, hemodynamically stable, and satting on room air on arrival.     Exam as above. TIM SU.     ED Course as of 07/14/25 0232   Sun Jul 13, 2025 2024 External chart review:  PCP office visit  Pt arrived to PACU from OR, VSS, pt arouses to voice. [place] pad absent of drainage. Will continue to monitor. 6/25/25  Seen for routine health maintenance     [SS]   2105 XR hip left with pelvis when performed 2 or 3 views  I have personally reviewed and interpreted this XR L hip, which shows femoral neck fx. Final decision making pending radiology read.   [SS]   2202 Personally discussed currently available facts and concerns about the case with ortho, who advises will eval pt   [SS]   2204 CBC and Auto Differential(!)  Mild anemia, no leukocytosis or thrombocytopenia [SS]   2204 Basic metabolic panel(!)  Potassium hemolyzed, no KAROLYN [SS]   2204 XR femur left 2+ views  Radiology read:  IMPRESSION:      Moderately displaced and angulated left subcapital femoral fracture.   [SS]      ED Course User Index  [SS] Steffen Simerlink, MD         Diagnoses as of 07/14/25 0232   Closed fracture of neck of left femur, initial encounter   Hypokalemia     XR imaging notable for L femoral neck fx. Ortho consulted rec OR in the AM. Labs obtained, notable for hypokalemia, K replacement ordered. Mg added on as well. Admitted to medicine.   ------------------------------------------------  Independent Test Interpretation: See ED Course  Personal Discussion with Other Providers: See ED Course    Disposition   As a result of their workup, the patient will require admission to the hospital.  The patient was informed of her diagnosis.  The patient was given the opportunity to ask questions and I answered them. The patient agreed to be admitted to the hospital.    Procedures   Procedures    Steffen Simerlink, MD Steffen Simerlink, MD  07/14/25 0234

## 2025-07-14 NOTE — ANESTHESIA PREPROCEDURE EVALUATION
"Patient: hSira Curry    Procedure Information       Date/Time: 07/14/25 1315    Procedure: Left HEMIARTHROPLASTY, HIP (Left: Hip)    Location: [facility] OR 18 / Virtual [facility] OR    Surgeons: Maury Lin MD            Relevant Problems   Cardiac   (+) Heart murmur   (+) Hypertension      Neuro   (+) Depression   (+) Major depressive disorder with single episode, in full remission      Endocrine   (+) Hypothyroidism       Clinical information reviewed:   Tobacco  Allergies  Meds  Problems  Med Hx  Surg Hx  OB Status    Fam Hx  Soc Hx         Medical History[1]   Surgical History[2]  Social History[3]   Current Outpatient Medications   Medication Instructions    clonazePAM (KLONOPIN) 0.5 mg, oral, Nightly    francy.stocking,thigh,reg,med misc 1 Units, See admin instructions    ergocalciferol (VITAMIN D-2) 1.25 mg, oral, Weekly    hydroCHLOROthiazide (HYDRODIURIL) 25 mg, oral, Daily    levothyroxine (SYNTHROID, LEVOXYL) 100 mcg, oral, Daily    mirtazapine (REMERON) 30 mg, oral, Nightly    prednisoLONE acetate (Pred Forte) 1 % ophthalmic suspension 1 drop, See admin instructions      RX Allergies[4]     Chemistry    Lab Results   Component Value Date/Time     (L) 07/14/2025 0550     (L) 06/25/2025 1011    K 4.2 07/14/2025 0550    K 3.2 (L) 06/25/2025 1011    CL 94 (L) 07/14/2025 0550    CL 93 (L) 06/25/2025 1011    CO2 25 07/14/2025 0550    CO2 30 06/25/2025 1011    BUN 13 07/14/2025 0550    BUN 13 06/25/2025 1011    CREATININE 0.69 07/14/2025 0550    CREATININE 0.86 06/25/2025 1011    Lab Results   Component Value Date/Time    CALCIUM 10.2 07/14/2025 0550    CALCIUM 10.4 06/25/2025 1011    ALKPHOS 59 06/25/2025 1011    AST 12 06/25/2025 1011    ALT 5 (L) 06/25/2025 1011    BILITOT 0.5 06/25/2025 1011          No results found for: \"HGBA1C\"  Lab Results   Component Value Date/Time    WBC 7.7 07/14/2025 0550    WBC 3.5 (L) 06/25/2025 1011    HGB 12.2 07/14/2025 0550    HGB 12.1 06/25/2025 1011    " HCT 34.8 (L) 07/14/2025 0550    HCT 37.6 06/25/2025 1011     07/14/2025 0550     06/25/2025 1011    ABO A 07/13/2025 2126    LABRH POS 07/13/2025 2126    ABSCRN NEG 07/13/2025 2126     Lab Results   Component Value Date/Time    PROTIME 13.0 (H) 07/13/2025 2126    INR 1.2 (H) 07/13/2025 2126     No results found for this or any previous visit (from the past 4464 hours).   No results found for this or any previous visit from the past 1095 days.    Results for orders placed in visit on 12/15/21    Echocardiogram 12/15/2021:  PHYSICIAN INTERPRETATION:  Left Ventricle: The left ventricular systolic function is normal, with an estimated ejection fraction of 55-60%. There are no regional wall motion abnormalities. The left ventricular cavity size is decreased. There is a false tendon visualized in the left ventricle. Spectral Doppler shows an impaired relaxation pattern of left ventricular diastolic filling.  Left Atrium: The left atrium is normal in size.  Right Ventricle: The right ventricle is normal in size. There is mildly reduced right ventricular systolic function.  Right Atrium: The right atrium is normal in size.  Aortic Valve: The aortic valve is trileaflet. There is no evidence of aortic valve regurgitation. The peak instantaneous gradient of the aortic valve is 4.5 mmHg.  Mitral Valve: The mitral valve is normal in structure. There is trace to mild mitral valve regurgitation. Normal MV leaflets, but non-obstructive PAUL of very redundant subvalvular apparatus. Only trace to mild MR.  Tricuspid Valve: The tricuspid valve is structurally normal. There is mild tricuspid regurgitation. The Doppler estimated RVSP is within normal limits at 26.0 mmHg.  Pulmonic Valve: The pulmonic valve is not well visualized. There is physiologic pulmonic valve regurgitation.  Pericardium: There is no pericardial effusion noted.  Aorta: The aortic root is normal. Prominent descendng aorta which is partially compressing  "the LA posteriorly.  Systemic Veins: The inferior vena cava size appears small. There is IVC inspiratory collapse greater than 50%.  In comparison to the previous echocardiogram(s): There are no prior studies on this patient for comparison purposes. No prior echocardiogram available for comparison.    CONCLUSIONS:  1. The left ventricular systolic function is normal with a 55-60% estimated ejection fraction.  2. LV false tendon present.  3. Spectral Doppler shows an impaired relaxation pattern of left ventricular diastolic filling.  4. Normal MV leaflets, but non-obstructive PAUL of very redundant subvalvular apparatus. Only trace to mild MR.  5. RVSP within normal limits.  6. The left ventricular cavity size is decreased.  7. No prior echocardiogram available for comparison.    Visit Vitals  /78   Pulse 73   Temp 37.6 °C (99.7 °F) (Temporal)   Resp 16   Ht 1.6 m (5' 3\")   Wt (!) 44.5 kg (98 lb 1.7 oz)   SpO2 99%   BMI 17.38 kg/m²   OB Status Hysterectomy   Smoking Status Never   BSA 1.41 m²     NPO/Void Status  Date of Last Liquid: 07/14/25  Time of Last Liquid: 0000  Date of Last Solid: 07/14/25  Time of Last Solid: 0000        Physical Exam    Airway  Mallampati: III  TM distance: >3 FB  Neck ROM: full  Mouth opening: 3 or more finger widths     Cardiovascular - normal exam  Rhythm: regular  Rate: normal     Dental    Pulmonary - normal exam   Abdominal - normal exam           Anesthesia Plan    History of general anesthesia?: yes  History of complications of general anesthesia?: no    ASA 3     general   (GETA with standard ASA monitoring)  intravenous induction   Postoperative pain plan includes opioids.  Anesthetic plan and risks discussed with patient.    Plan discussed with CAA and CRNA.             [1]   Past Medical History:  Diagnosis Date    Hypertension     Hypothyroidism    [2]   Past Surgical History:  Procedure Laterality Date    APPENDECTOMY      COLONOSCOPY      HYSTERECTOMY     [3]   Social " History  Tobacco Use    Smoking status: Never    Smokeless tobacco: Never   Substance Use Topics    Alcohol use: Not Currently    Drug use: Never   [4] No Known Allergies

## 2025-07-14 NOTE — CARE PLAN
The patient's goals for the shift include      The clinical goals for the shift include maintain pt safety    Problem: Pain - Adult  Goal: Verbalizes/displays adequate comfort level or baseline comfort level  Outcome: Progressing     Problem: Safety - Adult  Goal: Free from fall injury  Outcome: Progressing     Problem: Discharge Planning  Goal: Discharge to home or other facility with appropriate resources  Outcome: Progressing     Problem: Chronic Conditions and Co-morbidities  Goal: Patient's chronic conditions and co-morbidity symptoms are monitored and maintained or improved  Outcome: Progressing     Problem: Nutrition  Goal: Nutrient intake appropriate for maintaining nutritional needs  Outcome: Progressing     Problem: Skin  Goal: Decreased wound size/increased tissue granulation at next dressing change  Outcome: Progressing  Flowsheets (Taken 7/14/2025 0130)  Decreased wound size/increased tissue granulation at next dressing change: Promote sleep for wound healing  Goal: Participates in plan/prevention/treatment measures  Outcome: Progressing  Flowsheets (Taken 7/14/2025 0130)  Participates in plan/prevention/treatment measures: Elevate heels  Goal: Prevent/manage excess moisture  Outcome: Progressing  Flowsheets (Taken 7/14/2025 0130)  Prevent/manage excess moisture:   Cleanse incontinence/protect with barrier cream   Moisturize dry skin  Goal: Prevent/minimize sheer/friction injuries  Outcome: Progressing  Flowsheets (Taken 7/14/2025 0130)  Prevent/minimize sheer/friction injuries:   HOB 30 degrees or less   Use pull sheet   Complete micro-shifts as needed if patient unable. Adjust patient position to relieve pressure points, not a full turn   Turn/reposition every 2 hours/use positioning/transfer devices  Goal: Promote/optimize nutrition  Outcome: Progressing  Flowsheets (Taken 7/14/2025 0130)  Promote/optimize nutrition: Monitor/record intake including meals  Goal: Promote skin healing  Outcome:  Progressing  Flowsheets (Taken 7/14/2025 0130)  Promote skin healing:   Assess skin/pad under line(s)/device(s)   Protective dressings over bony prominences   Turn/reposition every 2 hours/use positioning/transfer devices   Rotate device position/do not position patient on device   Ensure correct size (line/device) and apply per  instructions

## 2025-07-14 NOTE — CONSULTS
"Reason For Consult  \"Hip fracture\"    History Of Present Illness  Shira Curry is a 89 y.o. female presenting with left hip pain after sustaining fall from standing earlier in the day.  She is unable to ambulate following this incident.  Denied paresthesia, numbness, swelling, skin changes of her hip.  Initial workup revealing radiographic evidence of moderately displaced and angulated left subcapital femoral fracture.  Orthopedics consulted for further management.     Past Medical History  She has a past medical history of Disease of thyroid gland, Hypertension, and Pain in unspecified hip (06/15/2021).    Surgical History  She has a past surgical history that includes Colonoscopy (12/30/2015) and Hysterectomy.     Social History  She reports that she has never smoked. She has never used smokeless tobacco. She reports that she does not currently use alcohol. She reports that she does not use drugs.    Family History  Family History[1]     Allergies  Patient has no known allergies.    Review of Systems  A full 10 point ROS was completed. Nothing positive other than what was mentioned in the HPI.     Physical Exam  PE:  Constitutional: A&Ox3, calm and cooperative, NAD  Cardiovascular: Normal rate and regular rhythm.  Respiratory/Thorax: Good symmetric chest expansion. No labored breathing.  Gastrointestinal: Abdomen nondistended, soft   Extremities: LLE: No skin changes or swelling of the left hip.  Tenderness to light palpation along anterior joint line.  Neurovascularly intact.  Brisk cap refill.  Neurological: A&Ox3, No focal deficits  Psychological: Appropriate mood and behavior  Skin: Warm and dry    Last Recorded Vitals  Blood pressure 158/77, pulse 70, temperature 36.6 °C (97.9 °F), temperature source Temporal, resp. rate 16, height 1.6 m (5' 3\"), weight (!) 44.5 kg (98 lb), SpO2 99%.    Relevant Results  Scheduled medications  Scheduled Medications[2]  Continuous medications  Continuous Medications[3]  PRN " medications  PRN Medications[4]    Results for orders placed or performed during the hospital encounter of 07/13/25 (from the past 24 hours)   CBC and Auto Differential   Result Value Ref Range    WBC 6.6 4.4 - 11.3 x10*3/uL    nRBC 0.0 0.0 - 0.0 /100 WBCs    RBC 4.09 4.00 - 5.20 x10*6/uL    Hemoglobin 11.8 (L) 12.0 - 16.0 g/dL    Hematocrit 35.2 (L) 36.0 - 46.0 %    MCV 86 80 - 100 fL    MCH 28.9 26.0 - 34.0 pg    MCHC 33.5 32.0 - 36.0 g/dL    RDW 15.2 (H) 11.5 - 14.5 %    Platelets 272 150 - 450 x10*3/uL    Neutrophils % 76.3 40.0 - 80.0 %    Immature Granulocytes %, Automated 0.8 0.0 - 0.9 %    Lymphocytes % 14.2 13.0 - 44.0 %    Monocytes % 7.1 2.0 - 10.0 %    Eosinophils % 1.1 0.0 - 6.0 %    Basophils % 0.5 0.0 - 2.0 %    Neutrophils Absolute 5.06 1.60 - 5.50 x10*3/uL    Immature Granulocytes Absolute, Automated 0.05 0.00 - 0.50 x10*3/uL    Lymphocytes Absolute 0.94 0.80 - 3.00 x10*3/uL    Monocytes Absolute 0.47 0.05 - 0.80 x10*3/uL    Eosinophils Absolute 0.07 0.00 - 0.40 x10*3/uL    Basophils Absolute 0.03 0.00 - 0.10 x10*3/uL   Basic metabolic panel   Result Value Ref Range    Glucose 116 (H) 74 - 99 mg/dL    Sodium 130 (L) 136 - 145 mmol/L    Potassium 3.2 (L) 3.5 - 5.3 mmol/L    Chloride 92 (L) 98 - 107 mmol/L    Bicarbonate 22 21 - 32 mmol/L    Anion Gap 19 10 - 20 mmol/L    Urea Nitrogen 13 6 - 23 mg/dL    Creatinine 0.80 0.50 - 1.05 mg/dL    eGFR 71 >60 mL/min/1.73m*2    Calcium 10.2 8.6 - 10.3 mg/dL   Type And Screen   Result Value Ref Range    ABO TYPE A     Rh TYPE POS     ANTIBODY SCREEN NEG    Protime-INR   Result Value Ref Range    Protime 13.0 (H) 9.8 - 12.4 seconds    INR 1.2 (H) 0.9 - 1.1   Potassium   Result Value Ref Range    Potassium 2.5 (LL) 3.5 - 5.3 mmol/L       Assessment/Plan     Labs and imaging reviewed.  Her case was discussed with Dr. Jurado and Dr Lauren.  She will be added to OR later in the day for left hip hemiarthroplasty.  Please keep NPO.  Nonweightbearing LLE.   She will require preop labs and clearance from the medicine team prior to surgery.    I did discuss this with the patient and her family at bedside.  She is very apprehensive for surgery at this time.  I told her the importance of surgery and the risks associated with foregoing.  Ultimately she was still agreeable to be added onto the board for now and discuss further with the orthopedic team earlier in the morning.    I spent 60 minutes in the professional and overall care of this patient.    Marcial Martin PA-C         [1]   Family History  Problem Relation Name Age of Onset    Hypertension Mother      Breast cancer Mother     [2] clonazePAM, 0.5 mg, oral, Nightly  heparin (porcine), 5,000 Units, subcutaneous, q8h  levothyroxine, 100 mcg, oral, Daily  mirtazapine, 30 mg, oral, Nightly  pantoprazole, 40 mg, oral, Daily before breakfast   Or  pantoprazole, 40 mg, intravenous, Daily before breakfast  potassium chloride, 20 mEq, intravenous, q2h  [3] lactated Ringer's, 75 mL/hr, Last Rate: 75 mL/hr (07/14/25 0021)  [4] PRN medications: acetaminophen **OR** acetaminophen **OR** acetaminophen, HYDROmorphone, HYDROmorphone, ondansetron **OR** ondansetron

## 2025-07-14 NOTE — PROGRESS NOTES
"Orthopaedic Surgery Progress Note    Subjective:  No acute events overnight. Continues to endorse L hip pain. Accompanied by daughter at bedside. NPO since MN.    Objective:  /77 (BP Location: Left arm, Patient Position: Lying)   Pulse 70   Temp 36.6 °C (97.9 °F) (Temporal)   Resp 16   Ht 1.6 m (5' 3\")   Wt (!) 44.5 kg (98 lb)   SpO2 99%   BMI 17.36 kg/m²     Gen: arousable, NAD, intermittently confused  Cardiac: RRR to peripheral palpation  Resp: nonlabored on RA  GI: soft, nondistended    MSK:  LEFT Lower Extremity:   -Skin intact  -Tender at site of injury with painful ROM.  -Fires DF/PF/EHL/FHL  -SILT in saph/sural/SPN/DPN distributions  -Foot warm, well perfused  -Palpable DP pulse, brisk cap refill  -Compartments soft and compressible      Results for orders placed or performed during the hospital encounter of 07/13/25 (from the past 24 hours)   CBC and Auto Differential   Result Value Ref Range    WBC 6.6 4.4 - 11.3 x10*3/uL    nRBC 0.0 0.0 - 0.0 /100 WBCs    RBC 4.09 4.00 - 5.20 x10*6/uL    Hemoglobin 11.8 (L) 12.0 - 16.0 g/dL    Hematocrit 35.2 (L) 36.0 - 46.0 %    MCV 86 80 - 100 fL    MCH 28.9 26.0 - 34.0 pg    MCHC 33.5 32.0 - 36.0 g/dL    RDW 15.2 (H) 11.5 - 14.5 %    Platelets 272 150 - 450 x10*3/uL    Neutrophils % 76.3 40.0 - 80.0 %    Immature Granulocytes %, Automated 0.8 0.0 - 0.9 %    Lymphocytes % 14.2 13.0 - 44.0 %    Monocytes % 7.1 2.0 - 10.0 %    Eosinophils % 1.1 0.0 - 6.0 %    Basophils % 0.5 0.0 - 2.0 %    Neutrophils Absolute 5.06 1.60 - 5.50 x10*3/uL    Immature Granulocytes Absolute, Automated 0.05 0.00 - 0.50 x10*3/uL    Lymphocytes Absolute 0.94 0.80 - 3.00 x10*3/uL    Monocytes Absolute 0.47 0.05 - 0.80 x10*3/uL    Eosinophils Absolute 0.07 0.00 - 0.40 x10*3/uL    Basophils Absolute 0.03 0.00 - 0.10 x10*3/uL   Basic metabolic panel   Result Value Ref Range    Glucose 116 (H) 74 - 99 mg/dL    Sodium 130 (L) 136 - 145 mmol/L    Potassium 3.2 (L) 3.5 - 5.3 mmol/L    " Chloride 92 (L) 98 - 107 mmol/L    Bicarbonate 22 21 - 32 mmol/L    Anion Gap 19 10 - 20 mmol/L    Urea Nitrogen 13 6 - 23 mg/dL    Creatinine 0.80 0.50 - 1.05 mg/dL    eGFR 71 >60 mL/min/1.73m*2    Calcium 10.2 8.6 - 10.3 mg/dL   Type And Screen   Result Value Ref Range    ABO TYPE A     Rh TYPE POS     ANTIBODY SCREEN NEG    Protime-INR   Result Value Ref Range    Protime 13.0 (H) 9.8 - 12.4 seconds    INR 1.2 (H) 0.9 - 1.1   Potassium   Result Value Ref Range    Potassium 2.5 (LL) 3.5 - 5.3 mmol/L   Magnesium   Result Value Ref Range    Magnesium 1.99 1.60 - 2.40 mg/dL   CBC   Result Value Ref Range    WBC 7.7 4.4 - 11.3 x10*3/uL    nRBC 0.0 0.0 - 0.0 /100 WBCs    RBC 4.21 4.00 - 5.20 x10*6/uL    Hemoglobin 12.2 12.0 - 16.0 g/dL    Hematocrit 34.8 (L) 36.0 - 46.0 %    MCV 83 80 - 100 fL    MCH 29.0 26.0 - 34.0 pg    MCHC 35.1 32.0 - 36.0 g/dL    RDW 14.5 11.5 - 14.5 %    Platelets 253 150 - 450 x10*3/uL       XR knee left 1-2 views   Final Result   No acute fracture or malalignment.        Degenerative changes as above.             MACRO:   None        Signed by: Shira Hinojosa 7/14/2025 12:42 AM   Dictation workstation:   [id number]      XR hip left with pelvis when performed 2 or 3 views   Final Result   Moderately displaced and angulated left subcapital femoral fracture.        MACRO:   None        Signed by: Ray Chowdhury 7/13/2025 9:30 PM   Dictation workstation:   [id number]      XR femur left 2+ views   Final Result        Moderately displaced and angulated left subcapital femoral fracture.        MACRO:   None        Signed by: Ray Chowdhury 7/13/2025 9:30 PM   Dictation workstation:   [id number]      XR chest 1 view   Final Result   1. Patchy right basilar airspace disease with atelectasis and   pneumonia in the differential                  MACRO:   None        Signed by: Ray Chowduhry 7/13/2025 9:31 PM   Dictation workstation:   [id number]      FL less than 1 hour    (Results Pending)        Assessment/Plan: 89 y.o. female who presented after mechanical ground-level fall sustaining a closed neurovasc intact left femoral neck fracture.    Plan:   - NPO for upcoming surgery with orthopedics.  - Admit to medicine , clear for OR  - Please place pagan in setting of immobilizing hip fracture   - Consented and posted to OR schedule for L hip teressa w/ orthopedic surgery on 7/14  - Strict Bedrest, NWB LLE extremity.   - Pre-operative ABx: None indicated   - No indication for transfusion pre-operatively  - DVT PPx: SCDs, chemoppx per primary    Consult seen and evaluated within 30 minutes of notification.     This consult was staffed with attending physician, Dr. Jurado.      Ambrosio Delaney MD  Orthopedic Surgery PGY-3  [facility]  Available by Epic Chat    On weekends and after 6PM:  At [facility]: Please reach out to the orthopaedic on-call resident ([phone])  At [place]: Please reach out to the orthopaedic on-call TREMAINE or resident (please refer to Thelma)

## 2025-07-14 NOTE — PROGRESS NOTES
"Orthopaedic Surgery Progress Note    PROCEDURE: L hip teressa  DOS: 7/14  PRIMARY: Medicine     Subjective: Evaluated in immediate postoperative period. Awaking from anesthesia.    Objective:  /78   Pulse 73   Temp 37.6 °C (99.7 °F) (Temporal)   Resp 16   Ht 1.6 m (5' 3\")   Wt (!) 44.5 kg (98 lb 1.7 oz)   SpO2 99%   BMI 17.38 kg/m²     Gen: arousable, NAD  Cardiac: RRR to peripheral palpation  Resp: nonlabored on RA  GI: soft, nondistended    MSK:  LEFT Lower Extremity:  -Surgical dressing c/d/i  -Fires DF/PF/EHL/FHL  -SILT in saph/sural/SPN/DPN distributions  -Foot warm, well perfused  -DP/PT pulse, brisk cap refill  -Compartments soft and compressible        Assessment/Plan: 89 y.o. female s/p L hip teressa on 7/14 with Dr. Lin.      Plan:  - Weight bearing: WBAT LLE, posterior hip precautions  - DVT ppx: SCDs, per primary - however recommend at least 4-6 weeks of ASA 81mg BID postoperatively  - Diet: OK from ortho standpoint  - Analgesia per primary  - Antibiotics: perioperative ancef 2g q8hr x3 doses  - Dressing: Mepilex remove POD7  - PT/OT  - No plans for return to OR this admission with orthopaedic surgery     Dispo: per primary    Ambrosio Delaney MD  Orthopaedic Surgery PGY-2  [facility]  Pager: [phone]  Available by Epic Chat    While admitted, this patient will be followed by the [facility] General Team. Please contact below residents with any questions (available via Epic Chat).     Ambrosio Delaney, PGY-3  Chad Silva, PGY-5      "

## 2025-07-14 NOTE — SIGNIFICANT EVENT
Medical clearance    Shira Curry is a 89-year-old female who suffered a ground-level mechanical fall resulting in a moderately displaced and angulated left subcapital femoral fracture that that will be repaired in the a.m. via left hemiarthroplasty by orthopedic surgery.  She poses a moderate risk for perioperative complications due to her frail stature and advanced age of 89 years but the need to fix the hip is great as without it she will be left in excruciating pain unable to bear weight or ambulate with her wheeled walker.  Therefore she is acceptable medical risk to proceed with the surgical repair of the left hip fracture.    Mike Carlisle D.O.

## 2025-07-14 NOTE — ANESTHESIA POSTPROCEDURE EVALUATION
Patient: Shira Curry    Procedure Summary       Date: 07/14/25 Room / Location: [facility] OR 18 / Virtual [facility] OR    Anesthesia Start: 1342 Anesthesia Stop: 1518    Procedure: Left HEMIARTHROPLASTY, HIP (Left: Hip) Diagnosis:       Closed fracture of neck of left femur, initial encounter      (Closed fracture of neck of left femur, initial encounter [S72.002A])    Surgeons: Maury Lin MD Responsible Provider: Nabil Cooper MD    Anesthesia Type: general ASA Status: 3            Anesthesia Type: general    Vitals Value Taken Time   /78 07/14/25 16:15   Temp 37 °C (98.6 °F) 07/14/25 16:15   Pulse 74 07/14/25 16:15   Resp 17 07/14/25 16:15   SpO2 94 % 07/14/25 16:15       Anesthesia Post Evaluation    Patient location during evaluation: PACU  Patient participation: complete - patient participated  Level of consciousness: awake and alert  Pain management: adequate  Airway patency: patent  Cardiovascular status: acceptable and hemodynamically stable  Respiratory status: acceptable, spontaneous ventilation and nonlabored ventilation  Hydration status: acceptable  Postoperative Nausea and Vomiting: none        There were no known notable events for this encounter.

## 2025-07-14 NOTE — NURSING NOTE
Pt refusing heparin for dvt prophylaxis.  Pt educated on risk.  Pt continues to refuse.  Provider notified.

## 2025-07-14 NOTE — OP NOTE
Left HEMIARTHROPLASTY, HIP (L) Operative Note     Date: 2025 - 2025  OR Location: [facility] OR    Name: Shira Curry, : 1935, Age: 89 y.o., MRN: [id number], Sex: female    Diagnosis  Pre-op Diagnosis      * Closed fracture of neck of left femur, initial encounter [S72.002A] Post-op Diagnosis     * Closed fracture of neck of left femur, initial encounter [S72.002A]     Procedures  Left HEMIARTHROPLASTY, HIP  59661 - MT HEMIARTHROPLASTY HIP PARTIAL      Surgeons      * Maury Lin - Primary    Resident/Fellow/Other Assistant:  Surgeons and Role:     * Chad Silva MD - Resident - Assisting     * Ambrosio Delaney MD - Resident - Assisting    Staff:   Relief Circulator: Abbe Herring Person: Lamonte  Circulator: Yesi    Anesthesia Staff: Anesthesiologist: Nabil Cooper MD  C-AA: CRUZITO De Souza    Procedure Summary  Anesthesia: General  ASA: III  Estimated Blood Loss: 25 mL  Intra-op Medications:   Administrations occurring from 1315 to 1535 on 25:   Medication Name Total Dose   sodium chloride 0.9 % irrigation solution 3,000 mL   sodium chloride 0.9 % irrigation solution 1,000 mL   ropivacaine-epinephrine-clonidine-ketorolac 2.46-0.005- 0.0008-0.3mg/mL periarticular syringe 50 mL   ceFAZolin (Ancef) vial 1 g 2 g   dexAMETHasone (Decadron) 4 mg/mL 8 mg   fentaNYL (Sublimaze) injection 50 mcg/mL 100 mcg   glycopyrrolate (Robinul) 0.2 mg/mL injection VIAL 0.1 mg   heparin (porcine) injection 5,000 Units Cannot be calculated   HYDROmorphone (Dilaudid) injection 1 mg/mL 0.2 mg   lactated Ringer's infusion Cannot be calculated   lidocaine (Xylocaine) 2 % 40 mg   lubricating eye drops ophthalmic solution 2 drop   phenylephrine 100 mcg/mL syringe 10 mL (prefilled) 100 mcg   propofol (Diprivan) injection 10 mg/mL 70 mg   rocuronium 10 mg/mL 50 mg   tranexamic acid (Cyklokapron) injection 1,000 mg              Anesthesia Record               Intraprocedure I/O Totals          Intake     Tranexamic Acid 0.00 mL    The total shown is the total volume documented since Anesthesia Start was filed.    Total Intake 0 mL          Specimen: No specimens collected              Drains and/or Catheters:   External Urinary Catheter Female (Active)       Tourniquet Times:         Implants:  Implants       Type Name Action Serial No.      Joint Hip STEM, FEMORAL, ACTIS COLLAR, STD, SIZE 6 - [id number] Implanted       Self centering bipolar head Implanted      Joint Hip HEAD, FEMORAL, 12/14 TAPER, ARTICUL/NIR, 28 MM, +1.5 MM NECK, COBALT CHROME - [id number] Implanted               Findings: Low left femoral neck fracture.    Indications: Shira Curry is an 89 y.o. female who is having surgery for Closed fracture of neck of left femur, initial encounter [S72.002A].  Displaced left femoral neck fracture.  Here for hemiarthroplasty.    The patient was seen in the preoperative area. The risks, benefits, complications, treatment options, non-operative alternatives, expected recovery and outcomes were discussed with the patient. The possibilities of reaction to medication, pulmonary aspiration, injury to surrounding structures, bleeding, recurrent infection, the need for additional procedures, failure to diagnose a condition, and creating a complication requiring transfusion or operation were discussed with the patient. The patient concurred with the proposed plan, giving informed consent.  The site of surgery was properly noted/marked if necessary per policy. The patient has been actively warmed in preoperative area. Preoperative antibiotics have been ordered and given within 1 hours of incision. Venous thrombosis prophylaxis are not indicated.    Procedure Details:   Postop DX: Left femoral neck fracture  Postop DX: Same   Procedure: Left hip hemiarthroplasty  Surgeon: Maury Lin MD  Asst :   Ambrosio Delaney MD; Chad Silva MD  Anesthesia:   General Endotracheal  Implants:   DePuy Actis size 6 standard  stem.  1.5 mm neck, 48 mm bipolar head  Clinical Note: 89 year-old female admitted after mechanical fall.  Displaced left femoral neck fracture.  Cleared by medicine.  Here for hemiarthroplasty.  Discussed risk of surgery including but not limited to dislocation, bleeding, infection, fracture, DVT, instability, possible need for further surgery.  Understood these risks wished proceed.  Procedure Note: Patient brought to operating room.  Timeout performed.  Antibiotics given IV.  General endotracheal anesthesia given.  Transferred to the OR table.  Placed in the lateral decubitus position with the operative hip up.  All bony problems were padded well.  Axillary roll was placed.  Hip was prepped and draped typical sterile fashion.  Posterior approach to the hip was performed.  Incision through skin down to gluteal fascia.  External rotators and capsule elevated off the femoral neck and tagged.  Femoral neck fracture identified.  Saw was used to cut out the inferior aspect of the fracture approximately 5 mm proximal to the lesser trochanter.  Head was removed and sized for 48 mm.  Femur was broached using the Actis femoral implant system.  Broached to size 6 stable trial stem  with good fixation in the bone.  Reduced with a standard neck and 48 mm bipolar head.    Equal leg lengths.  Trial components removed.  Femoral stem was impacted in place with good fixation.  Reduced with bipolar head.  Good flexibility and stability.  Hip was irrigated.  Capsule and external rotators repaired through drill holes.  Wounds closed in layers.  Soft compressive dressing applied. Tolerated procedure well.  Taken recovery in stable condition.  Evidence of Infection: No   Complications:  None; patient tolerated the procedure well.    Disposition: PACU - hemodynamically stable.  Condition: stable                 Additional Details: None    Attending Attestation: I was present and scrubbed for the entire procedure.    Maury LANDIN  Delia  Phone Number: [phone]

## 2025-07-14 NOTE — H&P
History Of Present Illness  Shira Curry is a 89 y.o. female history of mechanical fall.  The patient endorses that this evening she stood up from the couch and slipped falling on her left hip.  She has had continued pain in her left hip and upper left leg since the fall.  She did not hit her head.  Normally she ambulates with a rolled walker but had difficulty getting back on her feet after the fall because she could not bear weight on her left leg.  X-ray of the left hip showed a moderately displaced and angulated left subcapital femoral fracture.  The orthopedic resident has evaluated the patient and the left femoral neck fracture is going to be surgically repaired in the OR 7/14/2025 via left hemiarthroplasty.     Past Medical History  Disease of thyroid gland       Hypertension      Pain in unspecified hip 06/15/2021     Hip pain       Surgical History   COLONOSCOPY   12/30/2015     Complete Colonoscopy    HYSTERECTOMY           Social History  She reports that she has never smoked. She has never used smokeless tobacco. She reports that she does not currently use alcohol. She reports that she does not use drugs.    Family History   Hypertension Mother        Breast cancer Mother           Allergies  Patient has no known allergies.    Review of Systems   Constitutional:  Positive for activity change and fatigue.   HENT: Negative.     Eyes: Negative.    Respiratory: Negative.     Cardiovascular: Negative.    Gastrointestinal: Negative.    Endocrine: Negative.    Genitourinary: Negative.    Musculoskeletal:  Positive for gait problem.        Left hip pain left leg pain   Skin: Negative.    Allergic/Immunologic: Negative.    Hematological: Negative.    Psychiatric/Behavioral: Negative.     All other systems reviewed and are negative.       Physical Exam  Vitals and nursing note reviewed.   Constitutional:       Appearance: Normal appearance. She is ill-appearing.   HENT:      Head: Normocephalic.      Right Ear:  "External ear normal.      Left Ear: External ear normal.      Nose: Nose normal.      Mouth/Throat:      Mouth: Mucous membranes are dry.      Pharynx: Oropharynx is clear.   Eyes:      Extraocular Movements: Extraocular movements intact.      Conjunctiva/sclera: Conjunctivae normal.      Pupils: Pupils are equal, round, and reactive to light.   Cardiovascular:      Rate and Rhythm: Normal rate and regular rhythm.   Pulmonary:      Effort: Pulmonary effort is normal.      Breath sounds: Normal breath sounds.   Abdominal:      General: Abdomen is flat. Bowel sounds are normal.      Palpations: Abdomen is soft.   Musculoskeletal:         General: Normal range of motion.      Comments: Left hip tenderness.  Any movement of the left leg causes left hip pain   Skin:     General: Skin is warm and dry.   Neurological:      General: No focal deficit present.      Mental Status: She is alert. Mental status is at baseline.   Psychiatric:         Mood and Affect: Mood normal.         Behavior: Behavior normal.          Last Recorded Vitals  Blood pressure 142/77, pulse 69, temperature 36.6 °C (97.9 °F), temperature source Oral, resp. rate 16, height 1.6 m (5' 3\"), weight (!) 44.5 kg (98 lb), SpO2 97%.    Relevant Results  Meds:  Scheduled medications  Scheduled Medications[1]  Continuous medications  Continuous Medications[2]  PRN medications  PRN Medications[3]   Current Outpatient Medications   Medication Instructions    clonazePAM (KLONOPIN) 0.5 mg, oral, Nightly    francy.stocking,thigh,reg,med misc 1 Units, See admin instructions    ergocalciferol (VITAMIN D-2) 1.25 mg, oral, Weekly    hydroCHLOROthiazide (HYDRODIURIL) 25 mg, oral, Daily    levothyroxine (SYNTHROID, LEVOXYL) 100 mcg, oral, Daily    mirtazapine (REMERON) 30 mg, oral, Nightly    prednisoLONE acetate (Pred Forte) 1 % ophthalmic suspension 1 drop, See admin instructions        Labs:  Results for orders placed or performed during the hospital encounter of " 07/13/25 (from the past 24 hours)   CBC and Auto Differential   Result Value Ref Range    WBC 6.6 4.4 - 11.3 x10*3/uL    nRBC 0.0 0.0 - 0.0 /100 WBCs    RBC 4.09 4.00 - 5.20 x10*6/uL    Hemoglobin 11.8 (L) 12.0 - 16.0 g/dL    Hematocrit 35.2 (L) 36.0 - 46.0 %    MCV 86 80 - 100 fL    MCH 28.9 26.0 - 34.0 pg    MCHC 33.5 32.0 - 36.0 g/dL    RDW 15.2 (H) 11.5 - 14.5 %    Platelets 272 150 - 450 x10*3/uL    Neutrophils % 76.3 40.0 - 80.0 %    Immature Granulocytes %, Automated 0.8 0.0 - 0.9 %    Lymphocytes % 14.2 13.0 - 44.0 %    Monocytes % 7.1 2.0 - 10.0 %    Eosinophils % 1.1 0.0 - 6.0 %    Basophils % 0.5 0.0 - 2.0 %    Neutrophils Absolute 5.06 1.60 - 5.50 x10*3/uL    Immature Granulocytes Absolute, Automated 0.05 0.00 - 0.50 x10*3/uL    Lymphocytes Absolute 0.94 0.80 - 3.00 x10*3/uL    Monocytes Absolute 0.47 0.05 - 0.80 x10*3/uL    Eosinophils Absolute 0.07 0.00 - 0.40 x10*3/uL    Basophils Absolute 0.03 0.00 - 0.10 x10*3/uL   Basic metabolic panel   Result Value Ref Range    Glucose 116 (H) 74 - 99 mg/dL    Sodium 130 (L) 136 - 145 mmol/L    Potassium 3.2 (L) 3.5 - 5.3 mmol/L    Chloride 92 (L) 98 - 107 mmol/L    Bicarbonate 22 21 - 32 mmol/L    Anion Gap 19 10 - 20 mmol/L    Urea Nitrogen 13 6 - 23 mg/dL    Creatinine 0.80 0.50 - 1.05 mg/dL    eGFR 71 >60 mL/min/1.73m*2    Calcium 10.2 8.6 - 10.3 mg/dL   Type And Screen   Result Value Ref Range    ABO TYPE A     Rh TYPE POS     ANTIBODY SCREEN NEG    Protime-INR   Result Value Ref Range    Protime 13.0 (H) 9.8 - 12.4 seconds    INR 1.2 (H) 0.9 - 1.1   Potassium   Result Value Ref Range    Potassium 2.5 (LL) 3.5 - 5.3 mmol/L      Imaging:  Imaging  XR chest 1 view  Result Date: 7/13/2025  1. Patchy right basilar airspace disease with atelectasis and pneumonia in the differential       MACRO: None   Signed by: Ray Chowdhury 7/13/2025 9:31 PM Dictation workstation:   [id number]    XR femur left 2+ views  Result Date: 7/13/2025    Moderately displaced and  angulated left subcapital femoral fracture.   MACRO: None   Signed by: Ray Chowdhury 7/13/2025 9:30 PM Dictation workstation:   [id number]    XR hip left with pelvis when performed 2 or 3 views  Result Date: 7/13/2025  Moderately displaced and angulated left subcapital femoral fracture.   MACRO: None   Signed by: Ray Chowdhury 7/13/2025 9:30 PM Dictation workstation:   [id number]      Cardiology, Vascular, and Other Imaging  No other imaging results found for the past 2 days       Assessment/Plan   Ground-level mechanical fall resulting in a moderately displaced and angulated left subcapital femoral fracture  Plan:  N.p.o.  IV hydration  Pain control  Ortho has evaluated the patient and plans to do left hemiarthroplasty in the morning    Hypothyroidism  Plan:  Levothyroxine 100 mcg orally    Hypertension  Plan:  Holding hydrochlorothiazide for now    Hypokalemia  Plan:  Replace and recheck    DVT prophylaxis  Heparin 5000 units subcutaneous every 8 hours  SCDs    I spent 60 minutes in the professional and overall care of this patient.      Mike Carlisle DO                       [1] clonazePAM, 0.5 mg, oral, Nightly  heparin (porcine), 5,000 Units, subcutaneous, q8h  [START ON 7/14/2025] levothyroxine, 100 mcg, oral, Daily  mirtazapine, 30 mg, oral, Nightly  [START ON 7/14/2025] pantoprazole, 40 mg, oral, Daily before breakfast   Or  [START ON 7/14/2025] pantoprazole, 40 mg, intravenous, Daily before breakfast  potassium chloride, 40 mEq, oral, Once  potassium chloride, 20 mEq, intravenous, q2h     [2] lactated Ringer's, 75 mL/hr     [3] PRN medications: acetaminophen **OR** acetaminophen **OR** acetaminophen, HYDROmorphone, HYDROmorphone, ondansetron **OR** ondansetron

## 2025-07-14 NOTE — PROGRESS NOTES
07/14/25 0844   Discharge Planning   Living Arrangements Alone   Support Systems Children   Assistance Needed Uses a walker at baseline and is independent with ADLs   Type of Residence Assisted living   Do you have animals or pets at home? No   Care [facility]   Home or Post Acute Services Post acute facilities (Rehab/SNF/etc)   Type of Post Acute Facility Services Skilled nursing   Expected Discharge Disposition SNF   Does the patient need discharge transport arranged? Yes   [facility] coordination needed? Yes   Has discharge transport been arranged? No   What day is the transport expected? 07/16/25   Financial Resource Strain   How hard is it for you to pay for the very basics like food, housing, medical care, and heating? Not hard   Housing Stability   In the last 12 months, was there a time when you were not able to pay the mortgage or rent on time? N   In the past 12 months, how many times have you moved where you were living? 0   At any time in the past 12 months, were you homeless or living in a shelter (including now)? N   Transportation Needs   In the past 12 months, has lack of transportation kept you from medical appointments or from getting medications? no   In the past 12 months, has lack of transportation kept you from meetings, work, or from getting things needed for daily living? No   Patient Choice   Provider Choice list and CMS website (https://medicare.gov/care-compare#search) for post-acute Quality and Resource Measure Data were provided and reviewed with: Family   Patient / Family choosing to utilize agency / facility established prior to hospitalization No   Stroke Family Assessment   Stroke Family Assessment Needed No   Intensity of Service   Intensity of Service 0-30 min     Met with patient and her two daughters at the bedside to discuss discharge plan.  Anticipate patient will require SNF at discharge.  Patient's family would like referral sent to [facility] as  patient is from [place].  Requested referral be sent via DSC into [facility].  PLAN/BARRIER: surgery today due to fx left femur neck, PT, OT  DISP: SNF, will need insurance auth  ADOD: 2-3 days  Nisa Ray RN     7/14/25 @ 12647  [facility] has accepted patient.  She will need auth initiated with her [facility] ID: [id number] per Alise Ray RN

## 2025-07-14 NOTE — SIGNIFICANT EVENT
Orthopaedic Surgery Significant Event    Patient with L femoral neck fracture. Plan for OR tomorrow 7/14 for L hip hemiarthroplasty with ortho.     - Admit to medicine - please document clearance for OR  - NPO at [facility] 7/14  - Please obtain pre-op labs  - NWB LLE  - Formal note from surgical TREMAINE to follow    Ambrosio Delaney MD  Orthopedic Surgery PGY-3  [facility]  Available by Epic Chat    On weekends and after 6PM:  At [facility]: Please reach out to the orthopaedic on-call resident ([phone])  At [facility]: Please reach out to the orthopaedic on-call TREMAINE or resident (please refer to Qgenda)

## 2025-07-14 NOTE — ANESTHESIA PROCEDURE NOTES
Airway  Date/Time: 7/14/2025 1:47 PM  Reason: elective    Airway not difficult    Staffing  Performed: [facility]AA and [facility]   Authorized by: Nabil Cooper MD    Performed by: CRUZITO De Souza  Patient location during procedure: OR    Patient Condition  Indications for airway management: anesthesia  Patient position: sniffing  Sedation level: deep     Final Airway Details   Preoxygenated: yes  Final airway type: endotracheal airway  Successful airway: ETT   Successful intubation technique: direct laryngoscopy  Adjuncts used in placement: intubating stylet  Endotracheal tube insertion site: oral  Blade: Magda  Blade size: #3  ETT size (mm): 7.0  Cormack-Lehane Classification: grade I - full view of glottis  Placement verified by: chest auscultation and capnometry   Measured from: lips  ETT to lips (cm): 22  Number of attempts at approach: 1

## 2025-07-14 NOTE — PROGRESS NOTES
Pharmacy Medication History     Source of Information: [facility] Med List    Additional concerns with the patient's PTA list.   None    Notified Provider via Haiku : N/A    The following updates were made to the Prior to Admission medication list:     Medications ADDED:   None  Medications CHANGED:  Just specified the day of the week for ergocalciferol  Medications REMOVED:   None  Medications NOT TAKING:   Prednisolone eye drops    Allergy reviewed : Yes    Meds 2 Beds : N/A    Outpatient pharmacy confirmed and updated in chart : Yes    Pharmacy name: [facility]    The list below reflectives the updated PTA list. Please review each medication in order reconciliation for additional clarification and justification.    Prior to Admission Medications   Prescriptions Last Dose Informant Patient Reported? Taking?   clonazePAM (KlonoPIN) 0.5 mg tablet 2025 Bedtime  No Yes   Sig: Take 1 tablet (0.5 mg) by mouth once daily at bedtime.   francy.stocking,thigh,reg,med misc 2025  Yes Yes   Si Units see administration instructions.   ergocalciferol (Vitamin D-2) 1250 mcg (50,000 units) capsule   No No   Sig: Take 1 capsule (1.25 mg) by mouth 1 (one) time per week.   Patient taking differently: Take 1 capsule (1.25 mg) by mouth 1 (one) time per week.    hydroCHLOROthiazide (HYDRODiuril) 25 mg tablet 2025 Morning  No Yes   Sig: TAKE 1 TABLET BY MOUTH EVERY DAY   levothyroxine (Synthroid, Levoxyl) 100 mcg tablet 2025 Morning  No Yes   Sig: Take 1 tablet (100 mcg) by mouth once daily.   mirtazapine (Remeron) 30 mg tablet 2025 Bedtime  No Yes   Sig: TAKE 1 TABLET (30 MG) BY MOUTH ONCE DAILY AT BEDTIME.   prednisoLONE acetate (Pred Forte) 1 % ophthalmic suspension Not Taking  Yes No   Sig: Administer 1 drop into affected eye(s) see administration instructions.   Patient not taking: Reported on 2025      Facility-Administered Medications: None       The list below reflectives the  updated allergy list. Please review each documented allergy for additional clarification and justification.    No Known Allergies       07/14/25 at 2:12 PM - Randy Khan

## 2025-07-14 NOTE — PROGRESS NOTES
"Between 7AM-7PM please message me via Epic Secure Chat.  After 7PM please page Nocturnist on call.    [facility] Hospitalist Progress Note      Shira Curry    :  1935(89 y.o.)    MRN:  [id number]  Date: 25     Assessment and Plan:     Ground-level mechanical fall resulting in a moderately displaced and angulated left subcapital femoral fracture  - Ortho consulted. Left hip hemiarthroplasty with Dr. Lin on   - WBAT LLE, posterior hip precautions   - at least 4-6 weeks of ASA 81mg BID postoperatively for dvt ppx  - Dressing: Mepilex remove POD7  - PT/OT    Hyponatremia  - check urine sodium/osm, serum osm. Repeat BMP, if continues to drop stop fluids and fluid restrict    Hypothyroidism  - on synthroid    HTN  - hold hydrochlorothiazide due to hyponatremia    Hypokalemia  - improved with replacement    DVT Prophylaxis: asa 81 mg bid    Disposition: continue to monitor inpatient, await consultant recommendations, await test results, and await clinical improvement    Subjective:      Interval History:   Vitals and chart notes from overnight reviewed.   No acute issues overnight.   Patient seen and evaluated at bedside.   Seen after OR. No chest pain or shortness of breath. No nausea, vomiting,    Review of Systems:   Other than patient's chronic conditions and those complaints in the history above, the rest of the 10 systems review were done and were negative.     Current medications:  Scheduled Meds:Scheduled Medications[1]  Continuous Infusions:Continuous Medications[2]  PRN Meds:PRN Medications[3]      Objective:     Heart Rate:  [58-90]   Temp:  [36.2 °C (97.2 °F)-37.6 °C (99.7 °F)]   Resp:  [14-20]   BP: (142-178)/()   Height:  [160 cm (5' 3\")]   Weight:  [44.5 kg (98 lb)-44.5 kg (98 lb 1.7 oz)]   SpO2:  [94 %-99 %]          Physical Exam  Vitals and nursing note reviewed.   HENT:      Mouth/Throat:      Mouth: Mucous membranes are moist.      Pharynx: Oropharynx is " clear.   Cardiovascular:      Rate and Rhythm: Normal rate and regular rhythm.   Pulmonary:      Effort: Pulmonary effort is normal.   Abdominal:      Palpations: Abdomen is soft.   Neurological:      Mental Status: She is alert.         Labs:   Lab Results   Component Value Date     (L) 07/14/2025    K 4.2 07/14/2025    CL 94 (L) 07/14/2025    CO2 25 07/14/2025    BUN 13 07/14/2025    CREATININE 0.69 07/14/2025    GLUCOSE 131 (H) 07/14/2025    CALCIUM 10.2 07/14/2025    PROT 7.5 06/25/2025    BILITOT 0.5 06/25/2025    ALKPHOS 59 06/25/2025    AST 12 06/25/2025    ALT 5 (L) 06/25/2025       Lab Results   Component Value Date    WBC 7.7 07/14/2025    HGB 12.2 07/14/2025    HCT 34.8 (L) 07/14/2025    MCV 83 07/14/2025     07/14/2025          [1] ceFAZolin, 2 g, intravenous, q8h  clonazePAM, 0.5 mg, oral, Nightly  heparin (porcine), 5,000 Units, subcutaneous, q8h  levothyroxine, 100 mcg, oral, Daily  mirtazapine, 30 mg, oral, Nightly  pantoprazole, 40 mg, oral, Daily before breakfast   Or  pantoprazole, 40 mg, intravenous, Daily before breakfast    [2] lactated Ringer's, 75 mL/hr, Last Rate: Stopped (07/14/25 1513)    [3] PRN medications: acetaminophen **OR** acetaminophen **OR** acetaminophen, HYDROmorphone, HYDROmorphone, ondansetron **OR** ondansetron

## 2025-07-14 NOTE — H&P
[facility] Department of Orthopaedic Surgery   Surgical History & Physical <30 Days     History & Physical Reviewed:  H&P reviewed. The patient was examined and there are no changes to the H&P. Patient electing to proceed with surgery. Patient consented and posted. Relevant findings and updates are noted below:  No significant changes.     Home medications were reviewed with significant updates noted below:  No significant changes.       Ambrosio Delaney MD  PGY-3 Orthopedic Surgery  [facility]  Epic Message Preferred

## 2025-07-14 NOTE — NURSING NOTE
1515: Patient arrival to PACU, report received/care assumed.    1523: Family updated via Epic messenger    1526: XR tech at bedside    1547: Patient tolerating sips of water    1559: Handoff report given to room 735 RN    1617: Patient transported back to room 735 via bed, in stable condition with all belongings

## 2025-07-14 NOTE — CARE PLAN
The patient's goals for the shift include      The clinical goals for the shift include maintain pt safety    Over the shift, the patient did not make progress toward the following goals. Barriers to progression include . Recommendations to address these barriers include   Problem: Pain - Adult  Goal: Verbalizes/displays adequate comfort level or baseline comfort level  Outcome: Progressing   .

## 2025-07-14 NOTE — ED TRIAGE NOTES
Patient arrived from an independent living. Patient stood up from the couch and lost balance and fell. Patient is complaining of left hip to left mid thigh pain. Patient denies LOC and is not on blood thinners.

## 2025-07-15 ENCOUNTER — TELEPHONE (OUTPATIENT)
Dept: PRIMARY CARE | Facility: CLINIC | Age: 89
End: 2025-07-15
Payer: COMMERCIAL

## 2025-07-15 ENCOUNTER — APPOINTMENT (OUTPATIENT)
Dept: CARDIOLOGY | Facility: HOSPITAL | Age: 89
End: 2025-07-15
Payer: COMMERCIAL

## 2025-07-15 LAB
ANION GAP SERPL CALC-SCNC: 16 MMOL/L (ref 10–20)
ATRIAL RATE: 76 BPM
BASOPHILS # BLD AUTO: 0.01 X10*3/UL (ref 0–0.1)
BASOPHILS NFR BLD AUTO: 0.1 %
BUN SERPL-MCNC: 15 MG/DL (ref 6–23)
CALCIUM SERPL-MCNC: 9.4 MG/DL (ref 8.6–10.3)
CHLORIDE SERPL-SCNC: 93 MMOL/L (ref 98–107)
CO2 SERPL-SCNC: 23 MMOL/L (ref 21–32)
CREAT SERPL-MCNC: 0.71 MG/DL (ref 0.5–1.05)
EGFRCR SERPLBLD CKD-EPI 2021: 82 ML/MIN/1.73M*2
EOSINOPHIL # BLD AUTO: 0 X10*3/UL (ref 0–0.4)
EOSINOPHIL NFR BLD AUTO: 0 %
ERYTHROCYTE [DISTWIDTH] IN BLOOD BY AUTOMATED COUNT: 14.5 % (ref 11.5–14.5)
GLUCOSE SERPL-MCNC: 118 MG/DL (ref 74–99)
HCT VFR BLD AUTO: 31.6 % (ref 36–46)
HGB BLD-MCNC: 11.4 G/DL (ref 12–16)
IMM GRANULOCYTES # BLD AUTO: 0.06 X10*3/UL (ref 0–0.5)
IMM GRANULOCYTES NFR BLD AUTO: 0.6 % (ref 0–0.9)
LYMPHOCYTES # BLD AUTO: 0.85 X10*3/UL (ref 0.8–3)
LYMPHOCYTES NFR BLD AUTO: 8 %
MCH RBC QN AUTO: 29.7 PG (ref 26–34)
MCHC RBC AUTO-ENTMCNC: 36.1 G/DL (ref 32–36)
MCV RBC AUTO: 82 FL (ref 80–100)
MONOCYTES # BLD AUTO: 0.67 X10*3/UL (ref 0.05–0.8)
MONOCYTES NFR BLD AUTO: 6.3 %
NEUTROPHILS # BLD AUTO: 9.07 X10*3/UL (ref 1.6–5.5)
NEUTROPHILS NFR BLD AUTO: 85 %
NRBC BLD-RTO: 0 /100 WBCS (ref 0–0)
OSMOLALITY SERPL: 280 MOSM/KG (ref 280–300)
P AXIS: 0 DEGREES
P OFFSET: 170 MS
P ONSET: 124 MS
PLATELET # BLD AUTO: 245 X10*3/UL (ref 150–450)
POTASSIUM SERPL-SCNC: 3 MMOL/L (ref 3.5–5.3)
PR INTERVAL: 186 MS
Q ONSET: 217 MS
QRS COUNT: 13 BEATS
QRS DURATION: 82 MS
QT INTERVAL: 364 MS
QTC CALCULATION(BAZETT): 409 MS
QTC FREDERICIA: 393 MS
R AXIS: 23 DEGREES
RBC # BLD AUTO: 3.84 X10*6/UL (ref 4–5.2)
SODIUM SERPL-SCNC: 129 MMOL/L (ref 136–145)
T AXIS: 79 DEGREES
T OFFSET: 399 MS
VENTRICULAR RATE: 76 BPM
WBC # BLD AUTO: 10.7 X10*3/UL (ref 4.4–11.3)

## 2025-07-15 PROCEDURE — 97530 THERAPEUTIC ACTIVITIES: CPT | Mod: GP

## 2025-07-15 PROCEDURE — 80048 BASIC METABOLIC PNL TOTAL CA: CPT

## 2025-07-15 PROCEDURE — 2500000004 HC RX 250 GENERAL PHARMACY W/ HCPCS (ALT 636 FOR OP/ED)

## 2025-07-15 PROCEDURE — 97165 OT EVAL LOW COMPLEX 30 MIN: CPT | Mod: GO | Performed by: OCCUPATIONAL THERAPIST

## 2025-07-15 PROCEDURE — 2500000001 HC RX 250 WO HCPCS SELF ADMINISTERED DRUGS (ALT 637 FOR MEDICARE OP)

## 2025-07-15 PROCEDURE — 2500000002 HC RX 250 W HCPCS SELF ADMINISTERED DRUGS (ALT 637 FOR MEDICARE OP, ALT 636 FOR OP/ED)

## 2025-07-15 PROCEDURE — 97161 PT EVAL LOW COMPLEX 20 MIN: CPT | Mod: GP

## 2025-07-15 PROCEDURE — 2500000004 HC RX 250 GENERAL PHARMACY W/ HCPCS (ALT 636 FOR OP/ED): Performed by: HOSPITALIST

## 2025-07-15 PROCEDURE — 1200000002 HC GENERAL ROOM WITH TELEMETRY DAILY

## 2025-07-15 PROCEDURE — 85025 COMPLETE CBC W/AUTO DIFF WBC: CPT

## 2025-07-15 PROCEDURE — 36415 COLL VENOUS BLD VENIPUNCTURE: CPT

## 2025-07-15 PROCEDURE — 2500000001 HC RX 250 WO HCPCS SELF ADMINISTERED DRUGS (ALT 637 FOR MEDICARE OP): Performed by: STUDENT IN AN ORGANIZED HEALTH CARE EDUCATION/TRAINING PROGRAM

## 2025-07-15 PROCEDURE — 93005 ELECTROCARDIOGRAM TRACING: CPT

## 2025-07-15 PROCEDURE — 99232 SBSQ HOSP IP/OBS MODERATE 35: CPT | Performed by: STUDENT IN AN ORGANIZED HEALTH CARE EDUCATION/TRAINING PROGRAM

## 2025-07-15 PROCEDURE — 2500000001 HC RX 250 WO HCPCS SELF ADMINISTERED DRUGS (ALT 637 FOR MEDICARE OP): Performed by: HOSPITALIST

## 2025-07-15 PROCEDURE — 97535 SELF CARE MNGMENT TRAINING: CPT | Mod: GO | Performed by: OCCUPATIONAL THERAPIST

## 2025-07-15 RX ORDER — POTASSIUM CHLORIDE 1.5 G/1.58G
40 POWDER, FOR SOLUTION ORAL ONCE
Status: COMPLETED | OUTPATIENT
Start: 2025-07-15 | End: 2025-07-15

## 2025-07-15 RX ORDER — HALOPERIDOL LACTATE 5 MG/ML
2.5 INJECTION, SOLUTION INTRAMUSCULAR ONCE
Status: COMPLETED | OUTPATIENT
Start: 2025-07-15 | End: 2025-07-15

## 2025-07-15 RX ORDER — OXYCODONE HYDROCHLORIDE 5 MG/1
5 TABLET ORAL EVERY 4 HOURS PRN
Refills: 0 | Status: DISCONTINUED | OUTPATIENT
Start: 2025-07-15 | End: 2025-07-16 | Stop reason: HOSPADM

## 2025-07-15 RX ORDER — ACETAMINOPHEN 325 MG/1
975 TABLET ORAL EVERY 8 HOURS
Status: DISCONTINUED | OUTPATIENT
Start: 2025-07-15 | End: 2025-07-16 | Stop reason: HOSPADM

## 2025-07-15 RX ORDER — OXYCODONE HYDROCHLORIDE 5 MG/1
2.5 TABLET ORAL EVERY 4 HOURS PRN
Refills: 0 | Status: DISCONTINUED | OUTPATIENT
Start: 2025-07-15 | End: 2025-07-16 | Stop reason: HOSPADM

## 2025-07-15 RX ADMIN — MIRTAZAPINE 30 MG: 15 TABLET, FILM COATED ORAL at 20:02

## 2025-07-15 RX ADMIN — ACETAMINOPHEN 975 MG: 325 TABLET ORAL at 10:16

## 2025-07-15 RX ADMIN — POTASSIUM CHLORIDE 40 MEQ: 1.5 POWDER, FOR SOLUTION ORAL at 10:16

## 2025-07-15 RX ADMIN — HALOPERIDOL LACTATE 2.5 MG: 5 INJECTION, SOLUTION INTRAMUSCULAR at 06:43

## 2025-07-15 RX ADMIN — CLONAZEPAM 0.5 MG: 0.5 TABLET ORAL at 20:02

## 2025-07-15 RX ADMIN — LEVOTHYROXINE SODIUM 100 MCG: 0.1 TABLET ORAL at 08:37

## 2025-07-15 RX ADMIN — ASPIRIN 81 MG: 81 TABLET, COATED ORAL at 08:37

## 2025-07-15 RX ADMIN — CEFAZOLIN SODIUM 2 G: 2 SOLUTION INTRAVENOUS at 08:34

## 2025-07-15 RX ADMIN — ASPIRIN 81 MG: 81 TABLET, COATED ORAL at 20:02

## 2025-07-15 RX ADMIN — CEFAZOLIN SODIUM 2 G: 2 SOLUTION INTRAVENOUS at 15:47

## 2025-07-15 RX ADMIN — ACETAMINOPHEN 975 MG: 325 TABLET ORAL at 18:12

## 2025-07-15 ASSESSMENT — COGNITIVE AND FUNCTIONAL STATUS - GENERAL
DRESSING REGULAR LOWER BODY CLOTHING: A LOT
TOILETING: TOTAL
MOVING TO AND FROM BED TO CHAIR: A LOT
TURNING FROM BACK TO SIDE WHILE IN FLAT BAD: A LOT
STANDING UP FROM CHAIR USING ARMS: A LOT
WALKING IN HOSPITAL ROOM: A LOT
PERSONAL GROOMING: A LITTLE
DAILY ACTIVITIY SCORE: 14
MOVING TO AND FROM BED TO CHAIR: A LOT
PERSONAL GROOMING: A LITTLE
MOVING FROM LYING ON BACK TO SITTING ON SIDE OF FLAT BED WITH BEDRAILS: A LOT
DRESSING REGULAR UPPER BODY CLOTHING: A LOT
WALKING IN HOSPITAL ROOM: TOTAL
DRESSING REGULAR UPPER BODY CLOTHING: A LITTLE
MOBILITY SCORE: 12
EATING MEALS: A LITTLE
CLIMB 3 TO 5 STEPS WITH RAILING: TOTAL
TOILETING: A LOT
DAILY ACTIVITIY SCORE: 14
EATING MEALS: A LITTLE
HELP NEEDED FOR BATHING: A LOT
TURNING FROM BACK TO SIDE WHILE IN FLAT BAD: A LOT
HELP NEEDED FOR BATHING: A LOT
MOBILITY SCORE: 10
MOVING FROM LYING ON BACK TO SITTING ON SIDE OF FLAT BED WITH BEDRAILS: A LOT
DRESSING REGULAR LOWER BODY CLOTHING: A LOT
CLIMB 3 TO 5 STEPS WITH RAILING: A LOT
STANDING UP FROM CHAIR USING ARMS: A LOT

## 2025-07-15 ASSESSMENT — PAIN SCALES - GENERAL
PAINLEVEL_OUTOF10: 0 - NO PAIN

## 2025-07-15 ASSESSMENT — PAIN - FUNCTIONAL ASSESSMENT
PAIN_FUNCTIONAL_ASSESSMENT: 0-10

## 2025-07-15 ASSESSMENT — PAIN SCALES - PAIN ASSESSMENT IN ADVANCED DEMENTIA (PAINAD)
CONSOLABILITY: NO NEED TO CONSOLE
FACIALEXPRESSION: SMILING OR INEXPRESSIVE
BREATHING: NORMAL
TOTALSCORE: 0
BODYLANGUAGE: RELAXED

## 2025-07-15 ASSESSMENT — ACTIVITIES OF DAILY LIVING (ADL)
ADL_ASSISTANCE: INDEPENDENT
HOME_MANAGEMENT_TIME_ENTRY: 11
ADL_ASSISTANCE: INDEPENDENT

## 2025-07-15 NOTE — NURSING NOTE
Interdisciplinary team present: NP, PT, NM, CC, SW, Orthopedic Coordinator, and bedside RN.  Pain - controlled  Nausea - none  Discharge barrier - confused. Currently needs sitter  Discharge plan - will need facility placement. Is from [place]  Discharge date/time - pending

## 2025-07-15 NOTE — PROGRESS NOTES
Occupational Therapy    Evaluation/Treatment    Patient Name: Shira Curry  MRN: [id number]  Department: [place] [address]  Room: [facility]73-  Today's Date: 07/15/25  Time Calculation  Start Time: 0925  Stop Time: 0956  Time Calculation (min): 31 min       Assessment:  OT Assessment: Pt presents to OT this date and demos decreased balance, strength, endurance and cognition/safety awareness with new L posterior hip precautions resulting in decreased safety & independence with ADLs and functional transfers/mobility. Pt requires skilled OT services to address above deficits to safely return to OF.  Prognosis: Good  Barriers to Discharge Home: Physical needs, Cognition needs, Caregiver assistance  Caregiver Assistance: Caregiver assistance needed per identified barriers - however, level of patient's required assistance exceeds assistance available at home  Cognition Needs: 24hr supervision for safety awareness needed, Recollection or understanding of precautions/restrictions limited, Insight of patient limited regarding functional ability/needs, Cognition-related high falls risk  Physical Needs: 24hr mobility assistance needed, 24hr ADL assistance needed, High falls risk due to function or environment, Ambulating household distances limited by function/safety  Evaluation/Treatment Tolerance: Patient limited by fatigue  Medical Staff Made Aware: Yes  End of Session Communication: Bedside nurse  End of Session Patient Position: Up in chair, Alarm on  OT Assessment Results: Decreased ADL status, Decreased upper extremity strength, Decreased safe judgment during ADL, Decreased cognition, Decreased endurance, Decreased functional mobility, Decreased IADLs, Decreased trunk control for functional activities  Prognosis: Good  Evaluation/Treatment Tolerance: Patient limited by fatigue  Medical Staff Made Aware: Yes  Strengths: Premorbid level of function, Living arrangement secure, Housing layout, Access to adaptive/assistive  products  Barriers to Participation: Comorbidities, Insight into problems, Ability to acquire knowledge  Plan:  Treatment Interventions: ADL retraining, Functional transfer training, UE strengthening/ROM, Patient/family training, Cognitive reorientation, Endurance training, Equipment evaluation/education, Compensatory technique education, Neuromuscular reeducation  OT Frequency: 3 times per week (During this acute inpatient hospitalization.)  OT Discharge Recommendations: Moderate intensity level of continued care (Based on current functional status and rehab potential, patient is anticipated to tolerate and benefit from 5 or more days per week of skilled rehabilitative therapy after discharge from this acute inpatient hospitalization.)  Equipment Recommended upon Discharge:  (TBD)  OT Recommended Transfer Status: Assist of 1  OT - OK to Discharge: Yes (OT POC established this date)  Treatment Interventions: ADL retraining, Functional transfer training, UE strengthening/ROM, Patient/family training, Cognitive reorientation, Endurance training, Equipment evaluation/education, Compensatory technique education, Neuromuscular reeducation    Subjective     OT Visit Info:  OT Received On: 07/15/25  General Visit Info:   General  Reason for Referral: Pt is POD #1 s/p L hip hemiarthroplasty to repair moderately displaced and angulated left subcapital femoral fracture.  Referred By: Ambrosio Delaney MD  Past Medical History Relevant to Rehab: Past Medical History:  Diagnosis Date    Hypertension     Hypothyroidism       Family/Caregiver Present: Yes  Caregiver Feedback: dtr present and assisted with PLOF and home set up information as pt is questionable historian  Co-Treatment: PT  Co-Treatment Reason: to maximize safety and participation with skilled intervention  Prior to Session Communication: Bedside nurse  Patient Position Received: Bed, 3 rail up, Alarm on  Preferred Learning Style: auditory, kinesthetic, verbal,  visual  General Comment: Pt supine in bed upon arrival and agreeable to OT Eval/tx. Pt fully participatory in session, demos confusion throughout.   Precautions:  LE Weight Bearing Status: Weight Bearing as Tolerated (L LE)  Medical Precautions: Fall precautions  Post-Surgical Precautions: Left hip precautions (posterior)  Precautions Comment: tele            Pain:  Pain Assessment  Pain Assessment: 0-10  0-10 (Numeric) Pain Score: 0 - No pain    Objective   Cognition:  Overall Cognitive Status: Impaired at baseline  Orientation Level: Disoriented to situation, Disoriented to place, Disoriented to time  Memory: Exceptions to WFL  Short-Term Memory: Impaired  Working Memory: Impaired  Safety/Judgement: Exceptions to WFL  Insight: Moderate  Impulsive: Mildly  Task Initiation: Delayed initiation  Flexibility of Thought: Reduced flexibility  Planning: Reduced planning skills  Organization: Mildly disorganized  Processing Speed: Delayed           Home Living:  Type of Home: Assisted living ([facility])  Lives With: Alone  Home Adaptive Equipment: Cane (Rollator)  Home Layout: One level (elevator to reach second floor apartment)  Home Access: Level entry  Bathroom Shower/Tub: Walk-in shower  Bathroom Toilet: Handicapped height  Bathroom Equipment: Grab bars in shower, Built-in shower seat, Raised toilet seat with rails  Home Living Comments: dtr assisted with providing home set up information as pt is questionable historian  Prior Function:  Level of [place]: Independent with ADLs and functional transfers, Needs assistance with homemaking  Receives Help From: Family, Other (Comment) ([facility] staff)  ADL Assistance: Independent  Homemaking Assistance: Needs assistance ([facility] staff assists with IADLs per dtr)  Ambulatory Assistance: Independent (using rollator)  Hand Dominance: Right  Prior Function Comments: x1 fall in past 6 months per pt and dtr. dtr assisted with PLOF as pt is a questionable historian     ADL:  Eating  Deficit: Setup  LE Dressing Assistance: Maximal  LE Dressing Deficit:  (to don/doff socks and undergarment using AE, cues for teressa/compensatory techniques and use of AE to adhere to L posterior hip precautions)  Toileting Assistance with Device: Total  Toileting Deficit:  (total A for rear [place] hygiene, to remove purewick and manage brief up over hips)  Activities of Daily Living: Feeding  Feeding Level of Assistance: Setup  Feeding Where Assessed: Chair        LE Dressing  LE Dressing: Yes  LE Dressing Adaptive Equipment: Reacher, Sock aide  Sock Level of Assistance: Maximum assistance, Moderate verbal cues  Adult Briefs Level of Assistance: Maximum assistance, Moderate verbal cues  LE Dressing Where Assessed: Edge of bed  LE Dressing Comments: cues for use of AE and teressa/compensatory techniques to adhere to L posterior hip precautions    Toileting  Toileting Level of Assistance: Dependent, Maximum verbal cues  Where Assessed:  (standing at EOB)  Toileting Comments: cues for sequencing and effort, assist for [place] hygiene and brief management d/t difficutly removing B UEs from walker in standing  Activity Tolerance:  Endurance: Tolerates 10 - 20 min exercise with multiple rests       Bed Mobility/Transfers: Bed Mobility  Bed Mobility: Yes  Bed Mobility 1  Bed Mobility 1: Supine to sitting  Level of Assistance 1: Moderate assistance, +2, Moderate verbal cues, Moderate tactile cues  Bed Mobility Comments 1: HOB elevated, cues for sequencing, effort/initiation and UE placement on bed rail as well as body mechanics to adhere to hip precautions  Bed Mobility 2  Bed Mobility  2: Scooting  Level of Assistance 2: Contact guard, Minimal verbal cues  Bed Mobility Comments 2: fwd scooting at EOB, cues for safe hand placement and initiation    Transfers  Transfer: Yes  Transfer 1  Technique 1: Sit to stand, Stand to sit  Transfer Device 1: Gait belt, Walker  Transfer Level of Assistance 1: Moderate assistance, +2, Moderate  verbal cues, Minimal tactile cues  Trials/Comments 1: from EOB, cues for sequencing, safe hand placement, L LE start position and walker safety as well as to adhere to hip precautions, pt has difficulty with L foot positioning during stand>sit transfer  Transfers 2  Transfer From 2: Bed to  Transfer to 2: Chair with arms  Technique 2: Stand pivot  Transfer Device 2: Gait belt, Walker  Transfer Level of Assistance 2: Minimum assistance, +2, Moderate verbal cues, Minimal tactile cues  Trials/Comments 2: cues for sequencing, walker safety and alignment to chair          Sitting Balance:  Static Sitting Balance  Static Sitting-Balance Support: Bilateral upper extremity supported, Feet supported  Static Sitting-Level of Assistance: Close supervision  Static Sitting-Comment/Number of Minutes: at EOB  Dynamic Sitting Balance  Dynamic Sitting-Comments: SBA at EOB  Standing Balance:  Static Standing Balance  Static Standing-Balance Support: Bilateral upper extremity supported  Static Standing-Level of Assistance: Minimum assistance  Static Standing-Comment/Number of Minutes: using FWW  Dynamic Standing Balance  Dynamic Standing-Comments: Min A x2 using FWW    Sensation:  Light Touch: No apparent deficits  Sensation Comment: WFL  Strength:  Strength Comments: B UEs grossly 3/5 based on function     Perception:  Inattention/Neglect: Appears intact  Coordination:  Movements are Fluid and Coordinated: Yes      Extremities: RUE   RUE : Within Functional Limits and LUE   LUE: Within Functional Limits      Outcome Measures: [facility] Daily Activity  Putting on and taking off regular lower body clothing: A lot  Bathing (including washing, rinsing, drying): A lot  Putting on and taking off regular upper body clothing: A little  Toileting, which includes using toilet, bedpan or urinal: Total  Taking care of personal grooming such as brushing teeth: A little  Eating Meals: A little  Daily Activity - Total Score: 14        Education  Documentation  Handouts, taught by Meseret Gates OT at 7/15/2025  2:00 PM.  Learner: Patient  Readiness: Acceptance  Method: Explanation  Response: Needs Reinforcement  Comment: pt educated on L posterior hip precautions, L LE WBAT, walker safety, safe bed mobility/transfer techniques, AE for LB ADLs, etc.    Body Mechanics, taught by Meseret Gates OT at 7/15/2025  2:00 PM.  Learner: Patient  Readiness: Acceptance  Method: Explanation  Response: Needs Reinforcement  Comment: pt educated on L posterior hip precautions, L LE WBAT, walker safety, safe bed mobility/transfer techniques, AE for LB ADLs, etc.    Precautions, taught by Meseret Gates OT at 7/15/2025  2:00 PM.  Learner: Patient  Readiness: Acceptance  Method: Explanation  Response: Needs Reinforcement  Comment: pt educated on L posterior hip precautions, L LE WBAT, walker safety, safe bed mobility/transfer techniques, AE for LB ADLs, etc.    ADL Training, taught by Meseret Gates OT at 7/15/2025  2:00 PM.  Learner: Patient  Readiness: Acceptance  Method: Explanation  Response: Needs Reinforcement  Comment: pt educated on L posterior hip precautions, L LE WBAT, walker safety, safe bed mobility/transfer techniques, AE for LB ADLs, etc.         Goals:  Encounter Problems       Encounter Problems (Active)       ADLs       Patient will perform UB and LB bathing with minimal assist  level of assistance and grab bars and shower chair and long handled sponge.       Start:  07/15/25    Expected End:  07/29/25            Patient with complete upper body dressing with set up A level of assistance donning and doffing all UE clothes with PRN adaptive equipment.       Start:  07/15/25    Expected End:  07/29/25            Patient with complete lower body dressing with minimal assist  level of assistance donning and doffing all LE clothes  with PRN adaptive equipment.       Start:  07/15/25    Expected End:  07/29/25            Patient will complete daily grooming  tasks with set-up level of assistance and PRN adaptive equipment.       Start:  07/15/25    Expected End:  07/29/25            Patient will complete toileting including hygiene clothing management/hygiene with minimal assist  level of assistance and bedside commode.       Start:  07/15/25    Expected End:  07/29/25               MOBILITY       Patient will perform Functional mobility x Household distances/Community Distances with contact guard assist level of assistance and least restrictive device in order to improve safety and functional mobility.       Start:  07/15/25    Expected End:  07/29/25               TRANSFERS       Patient will perform bed mobility contact guard assist level of assistance and bed rails in order to improve safety and independence with mobility       Start:  07/15/25    Expected End:  07/29/25            Patient will complete functional transfers with least restrictive device with contact guard assist level of assistance.       Start:  07/15/25    Expected End:  07/29/25

## 2025-07-15 NOTE — NURSING NOTE
This nurse notified MD of pt's restlessness and agitation throughout this shift. Pt pulled out IV access and has refused to let me re-insert a new one. This nurse gave education to pt and called her daughter who says she's confused and thinks she's in her apartment. The charge nurse has assisted with trying re-direction and orientation of this patient. The PCNA is now sitting in pt's room because she keeps trying to get out of bed. Pt's daughter is on her way to re-orient her mother. Call light within reach.

## 2025-07-15 NOTE — PROGRESS NOTES
[facility] Hospitalist Progress Note      Between 7AM-7PM please message me via Epic Secure Chat.  After 7PM please page Nocturnist on call.        Assessment/Plan     Ground-level mechanical fall resulting in a moderately displaced and angulated left subcapital femoral fracture  - Ortho consulted. Left hip hemiarthroplasty with Dr. Lin on 7/14  - WBAT LLE, posterior hip precautions   - at least 4-6 weeks of ASA 81mg BID postoperatively for dvt ppx  - Dressing: Mepilex remove POD7  - PT/OT     Hyponatremia  Hypokalemia  - possibly component of hydrochlorothiazide or SIADH following surgery     Hypothyroidism  - on synthroid     HTN  - hold hydrochlorothiazide due to hyponatremia    Fluids: None  Electrolytes: Replete as needed  Nutrition: Regular  Ascencio: None  Invasive lines: None  Drains: None  O2: None    DVT Prophylaxis:  ASA 81mg BID    Discharge Planning: [facility] once med ready    Plan of care was discussed with patient    Total time spent: At least 38 minutes, providing counseling or in coordination of care. Total time on this day of visit includes record and documentation review before and after visit including documentation and time not explicitly included on EMR time stamp.      Subjective     Shira Curry is a 89 y.o. female on day 2 of admission presenting with Closed fracture of neck of left femur.    NAEON. Overall stable, complains of some non-specific muscle soreness.    Review of Systems  ROS as noted in subjective portion of note     Objective     Physical Exam  Vitals and nursing note reviewed.   Constitutional:       General: She is not in acute distress.  Cardiovascular:      Rate and Rhythm: Normal rate and regular rhythm.   Pulmonary:      Effort: Pulmonary effort is normal.      Breath sounds: Normal breath sounds.   Abdominal:      General: There is no distension.      Palpations: Abdomen is soft.      Tenderness: There is no abdominal tenderness.   Neurological:      Mental Status:  "She is alert.         Last Recorded Vitals  Blood pressure 128/76, pulse 97, temperature 36.4 °C (97.6 °F), temperature source Oral, resp. rate 18, height 1.6 m (5' 3\"), weight (!) 44.5 kg (98 lb 1.7 oz), SpO2 93%.    Medications  Scheduled Medications[1]   PRN Medications[2]                Gilma Lemons MD  [facility]         [1] acetaminophen, 975 mg, oral, q8h  aspirin, 81 mg, oral, BID  ceFAZolin, 2 g, intravenous, q8h  clonazePAM, 0.5 mg, oral, Nightly  levothyroxine, 100 mcg, oral, Daily  mirtazapine, 30 mg, oral, Nightly  pantoprazole, 40 mg, oral, Daily before breakfast     [2] PRN medications: ondansetron **OR** ondansetron, oxyCODONE, oxyCODONE    "

## 2025-07-15 NOTE — CARE PLAN
The patient's goals for the shift include      The clinical goals for the shift include Pt will remain free from fall/injury throughout this shift.    Over the shift, the patient did not make progress toward the following goals. Barriers to progression include . Recommendations to address these barriers include   Problem: Pain - Adult  Goal: Verbalizes/displays adequate comfort level or baseline comfort level  Outcome: Progressing   .

## 2025-07-15 NOTE — PROGRESS NOTES
"Orthopaedic Surgery Progress Note    PROCEDURE: L hip teressa  DOS: 7/14  PRIMARY: Medicine     Subjective: Evaluated on regular nursing floor. Sundowning overnight requiring bedside sitter. Otherwise no acute events and doing well from orthopaedics standpoint.    Objective:  /80 (BP Location: Right arm, Patient Position: Lying)   Pulse 97   Temp 36.2 °C (97.1 °F) (Temporal)   Resp 18   Ht 1.6 m (5' 3\")   Wt (!) 44.5 kg (98 lb 1.7 oz)   SpO2 98%   BMI 17.38 kg/m²     Gen: arousable, NAD, intermittently confused  Cardiac: RRR to peripheral palpation  Resp: nonlabored on RA  GI: soft, nondistended    MSK:  LEFT Lower Extremity:  -Surgical dressing c/d/i  -Fires DF/PF/EHL/FHL  -SILT in saph/sural/SPN/DPN distributions  -Foot warm, well perfused  -DP/PT pulse, brisk cap refill  -Compartments soft and compressible        Assessment/Plan: 89 y.o. female s/p L hip teressa on 7/14 with Dr. Lin.      Plan:  - Weight bearing: WBAT LLE, posterior hip precautions  - DVT ppx: SCDs, per primary - however recommend at least 4-6 weeks of ASA 81mg BID postoperatively  - Diet: OK from ortho standpoint  - Analgesia per primary  - Antibiotics: perioperative ancef 2g q8hr x3 doses  - Dressing: Mepilex remove POD7  - PT/OT  - No plans for return to OR this admission with orthopaedic surgery   - Orthopaedics will follow peripherally while in house    Dispo: per primary    Ambrosio Delaney MD  Orthopaedic Surgery PGY-2  [facility]  Pager: [phone]  Available by Epic Chat    While admitted, this patient will be followed by the [facility] General Team. Please contact below residents with any questions (available via Epic Chat).     Ambrosio Delaney, PGY-3  Chad Silva, PGY-5      "

## 2025-07-15 NOTE — TELEPHONE ENCOUNTER
Patient daughter Summer called stating her mom had a fall at the facility she currently in broke her hip. She stated she had surgery yesterday is doing well now at [facility]

## 2025-07-15 NOTE — PROGRESS NOTES
Physical Therapy    Physical Therapy Evaluation & Treatment    Patient Name: Shira Curry  MRN: [id number]  Department: [place] [address]  Room: [facility]73[facility]  Today's Date: 7/15/2025   Time Calculation  Start Time: 0924  Stop Time: 0955  Time Calculation (min): 31 min    Completion of this session, clinical decision making, and documentation performed under the supervision/direction of Odalys Laurent DPT.    Assessment/Plan   PT Assessment  PT Assessment Results: Decreased strength, Decreased range of motion, Decreased endurance, Impaired balance, Decreased mobility, Decreased cognition, Impaired judgement, Decreased safety awareness, Decreased skin integrity, Orthopedic restrictions  Rehab Prognosis: Good  Barriers to Discharge Home: Caregiver assistance, Cognition needs, Physical needs  Caregiver Assistance: Patient lives alone and/or does not have reliable caregiver assistance  Cognition Needs: 24hr supervision for safety awareness needed, Medication and/or medical management daily assist needed, Recollection or understanding of precautions/restrictions limited, Recollection or understanding of home exercise program limited, Insight of patient limited regarding functional ability/needs, Cognition-related high falls risk  Physical Needs: In-home setup navigation limited by function/safety, Ambulating household distances limited by function/safety, 24hr mobility assistance needed, 24hr ADL assistance needed, High falls risk due to function or environment, Weight bearing precautions unable to be safely maintained  Evaluation/Treatment Tolerance: Patient limited by fatigue, Patient limited by pain  Medical Staff Made Aware: Yes  Strengths: Access to adaptive/assistive products, Housing layout, Living arrangement secure, Premorbid level of function, Support of extended family/friends  Barriers to Participation: Comorbidities, Insight into problems  End of Session Communication: Bedside nurse  Assessment Comment: Pt is POD # 1 s/p L  teressa arthroplasty with post-op posterior hip precautions and WBAT. The pt presents with decreased independence with bed mobility, transfers, and gait. The pt requires cueing for maintenance of ARETHA precautions today d/t cognitive deficits. Contributing to these impairments are post-op pain, decreased L hip strength, decreased balance, and impaired ROM. The patient would benefit from continued PT to assess progress and further therapy needs, address the above functional limitations and impairments to improve independence and safety to allow for d/c from this acute stay.  End of Session Patient Position: Up in chair, Alarm on   IP OR SWING BED PT PLAN  Inpatient or Swing Bed: Inpatient  PT Plan  Treatment/Interventions: Bed mobility, Transfer training, Gait training, Stair training, Balance training, Neuromuscular re-education, Strengthening, Endurance training, Range of motion, Therapeutic exercise, Therapeutic activity, Home exercise program, Positioning  PT Plan: Ongoing PT  PT Frequency: 3 times per week (During this acute inpatient hospitalization.)  PT Discharge Recommendations: Moderate intensity level of continued care (Based on current functional status and rehab potential, patient is anticipated to tolerate and benefit from 5 or more days per week of skilled rehabilitative therapy after discharge from this acute inpatient hospitalization.)  Equipment Recommended upon Discharge: Other (comment) (TBD)  PT Recommended Transfer Status: Assist x2, Assistive device (2WW)  PT - OK to Discharge: Yes (PT POC established)      Subjective     PT Visit Info:  PT Received On: 07/15/25  General Visit Information:  General  Reason for Referral: Pt is POD #1 s/p L hip teressa-arthroplasty to repair moderately displaced and angulated left subcapital femoral fracture.  Referred By: Ambrosio Delaney MD  Past Medical History Relevant to Rehab: Medical History[1] Surgical History[2]   Family/Caregiver Present: Yes  Caregiver  Feedback: Daughter present and supportive  Co-Treatment: OT  Co-Treatment Reason: In order to maximize pt safety and participation in session  Prior to Session Communication: Bedside nurse  Patient Position Received: Bed, 3 rail up, Alarm on  Preferred Learning Style: auditory, kinesthetic, verbal, visual  General Comment: Pt pleasant and agreeable to PT session. Pt presents with confusion d/t hx of dementia.  Home Living:  Home Living  Type of Home: Assisted living  Lives With: Alone  Home Adaptive Equipment: Cane (rollator)  Home Layout: One level (elevator to reach second floor apartment)  Home Access: Level entry  Bathroom Shower/Tub: Walk-in shower  Bathroom Equipment: Built-in shower seat, Grab bars in shower, Grab bars around toilet  Prior Level of Function:  Prior Function Per Pt/Caregiver Report  Level of [place]: Independent with ADLs and functional transfers, Needs assistance with homemaking  Receives Help From: Family, Other (Comment) (AL staff)  ADL Assistance: Independent  Homemaking Assistance: Needs assistance (AL staff assists with cooking and laundry)  Ambulatory Assistance: Independent (Mod I with rollator)  Vocational: Retired  Prior Function Comments: Pt's daughter provided history. Dtr reports no falls in the past two months other than the fall that caused the current injury.  Precautions:  Precautions  LE Weight Bearing Status: Other (Comment) (LLE WBAT)  Medical Precautions: Fall precautions  Post-Surgical Precautions: Left hip precautions (L posterior hip precautions)  Precautions Comment: tele    Objective   Pain:  Pain Assessment  Pain Assessment: 0-10  0-10 (Numeric) Pain Score: 0 - No pain  Cognition:  Cognition  Overall Cognitive Status: Impaired  Orientation Level: Disoriented to place, Disoriented to time  Memory: Exceptions to WFL  Safety/Judgement: Exceptions to WFL  Insight: Mild  Impulsive: Mildly    General Assessments:    Activity Tolerance  Endurance: Tolerates 10 - 20 min  exercise with multiple rests    Sensation  Light Touch: No apparent deficits  Sensation Comment: WFL    Strength  Strength Comments: Pt demonstrates general deconditoning and decreased strength  Perception  Inattention/Neglect: Appears intact    Coordination  Movements are Fluid and Coordinated: Yes    Postural Control  Postural Control: Within Functional Limits  Trunk Control: Forward flexed posture    Static Sitting Balance  Static Sitting-Balance Support: Bilateral upper extremity supported, Feet supported  Static Sitting-Level of Assistance: Close supervision  Dynamic Sitting Balance  Dynamic Sitting-Balance Support: Bilateral upper extremity supported, Feet supported  Dynamic Sitting-Level of Assistance: Close supervision  Dynamic Sitting-Balance: Lateral lean, Forward lean, Trunk control activities    Static Standing Balance  Static Standing-Balance Support: Bilateral upper extremity supported  Static Standing-Level of Assistance: Minimum assistance  Dynamic Standing Balance  Dynamic Standing-Balance Support: Bilateral upper extremity supported  Dynamic Standing-Level of Assistance: Minimum assistance, Moderate assistance  Dynamic Standing-Balance: Forward lean, Turning  Functional Assessments:  Bed Mobility  Bed Mobility: Yes  Bed Mobility 1  Bed Mobility 1: Supine to sitting  Level of Assistance 1: Moderate assistance, +2, Moderate verbal cues (Mod A x2)    Bed Mobility 2  Bed Mobility  2: Scooting  Level of Assistance 2: Contact guard    Transfers  Transfer: Yes  Transfer 1  Technique 1: Sit to stand, Stand to sit  Transfer Device 1: Walker, Gait belt    Ambulation/Gait Training  Ambulation/Gait Training Performed: Yes  Ambulation/Gait Training 1  Surface 1: Level tile  Device 1: Rolling walker  Gait Support Devices: Gait belt  Assistance 1: Minimum assistance, Moderate verbal cues, Moderate tactile cues (Min A x2)  Quality of Gait 1: Narrow base of support, Diminished heel strike, Decreased step length,  Forward flexed posture, Antalgic (decreased foot clearance)    Stairs  Stairs: No    Extremity/Trunk Assessments:  RUE   RUE : Within Functional Limits  LUE   LUE: Within Functional Limits  RLE   RLE : Within Functional Limits  Strength RLE  RLE Overall Strength: Greater than or equal to 3/5 as evidenced by functional mobility  LLE   LLE : Within Functional Limits  AROM LLE (degrees)  LLE AROM Comment: Decreased hip ROM, unable to be formally assessed d/t precautions  Strength LLE  LLE Overall Strength: Greater than or equal to 3/5 as evidenced by functional mobility  Treatments:  Therapeutic Exercise  Therapeutic Exercise Performed: Yes  Therapeutic Exercise Activity 1: Pt performed x10 ankle pumps bilaterally in supine, and x10 LAQ on LLE seated EOB prior to OOB mobility. Pt and daughter educated on post-op HEP and performance of exercises up to 20 reps, 3x/day. Dtr educated on HEP program consisting of ankle pumps, glute sets, quad sets, heel slides, SAQ, hip abd, and LAQ.    Therapeutic Activity  Therapeutic Activity Performed: Yes  Therapeutic Activity 1: Pt and daughter educated on PT POC and role, post-op posterior hip precuations, ways in which they can be maintained during functional mobility, and safety awareness. Pt required max cueing and encouragement d/t cognitive deficits.    Bed Mobility  Bed Mobility: Yes  Bed Mobility 1  Bed Mobility 1: Supine to sitting  Level of Assistance 1: Moderate assistance, +2, Moderate verbal cues (Mod A x2)  Bed Mobility Comments 1: Pt required Mod A x2 for assist with trunk lifting and LE negotiation. Pt required cueing for sequencing of task and safety awareness. HOB elevated.  Bed Mobility 2  Bed Mobility  2: Scooting  Level of Assistance 2: Contact guard  Bed Mobility Comments 2: Pt able to scoot to EOB with CGA for steadying. Pt demonstrates ability to un-weight and shift hips. Pt cued to scoot to EOB to get feet on floor and increase stability.    Ambulation/Gait  Training  Ambulation/Gait Training Performed: Yes  Ambulation/Gait Training 1  Surface 1: Level tile  Device 1: Rolling walker  Gait Support Devices: Gait belt  Assistance 1: Minimum assistance, Moderate verbal cues, Moderate tactile cues (Min A x2)  Quality of Gait 1: Narrow base of support, Diminished heel strike, Decreased step length, Forward flexed posture, Antalgic (decreased foot clearance)  Comments/Distance (ft) 1: Pt ambulated x4' of side steps this session with Min A x2, 2WW, and increased time an effort. Pt required increased cueing for sequencing of task, 2WW management, and safety awareness throughout. Pt demonstrated increased shakiness with increased fatigue towards the end of ambulation bout.  Transfers  Transfer: Yes  Transfer 1  Technique 1: Sit to stand, Stand to sit  Transfer Device 1: Walker, Gait belt  Transfer Level of Assistance 1: Moderate assistance, +2, Moderate verbal cues, Minimal tactile cues (Mod A x2)  Trials/Comments 1: Pt performed x1 STS this session with Mod A x2, 2WW, and increased time and effort. Pt required assistance with concentric and eccentric phases of sit <> stand. Pt cued for hand positioning and putting LLE in front of RLE d/t increased pain and weakness. Pt has increased difficulty with moving L foot forward prior to sitting in chair.    Stairs  Stairs: No  Outcome Measures:  [facility] Basic Mobility  Turning from your back to your side while in a flat bed without using bedrails: A lot  Moving from lying on your back to sitting on the side of a flat bed without using bedrails: A lot  Moving to and from bed to chair (including a wheelchair): A lot  Standing up from a chair using your arms (e.g. wheelchair or bedside chair): A lot  To walk in hospital room: Total  Climbing 3-5 steps with railing: Total  Basic Mobility - Total Score: 10    Encounter Problems       Encounter Problems (Active)       Balance       STG - Maintains dynamic standing balance with CGA, WW,  unilateral upper extremity support, and stable COM x8 minutes in order to perform functional standing tasks.       Start:  07/15/25    Expected End:  07/29/25       INTERVENTIONS:1. Practice standing with minimal support.2. Educate patient about standing tolerance.3. Educate patient about independence with gait, transfers, and ADL's.4. Educate patient about use of assistive device.5. Educate patient about self-directed care.            Mobility       STG - Patient will ambulate x100' with WW and CGA       Start:  07/15/25    Expected End:  07/29/25            Pt will perform supine and seated TE program up to 20 reps per exercise, 3x/day in order to attain stated functional goals         Start:  07/15/25    Expected End:  07/29/25               PT Transfers       STG - Patient to transfer to and from sit to supine with CGA.       Start:  07/15/25    Expected End:  07/29/25            STG - Patient will transfer sit to and from stand with WW and CGA.       Start:  07/15/25    Expected End:  07/29/25               PT Transfers       STG - Transfer from bed to chair with WW and CGA.       Start:  07/15/25    Expected End:  07/29/25               Safety       Pt will maintain posterior hip precautions and WBAT LLE during all functional mobility, 100% of the time, without cueing.       Start:  07/15/25    Expected End:  07/29/25                   Education Documentation  Handouts, taught by ALPHONSO Barron at 7/15/2025 11:47 AM.  Learner: Family, Patient  Readiness: Acceptance  Method: Explanation  Response: Needs Reinforcement, Verbalizes Understanding  Comment: See education comment in therapeutic activity section    Precautions, taught by ALPHONSO Barron at 7/15/2025 11:47 AM.  Learner: Family, Patient  Readiness: Acceptance  Method: Explanation  Response: Needs Reinforcement, Verbalizes Understanding  Comment: See education comment in therapeutic activity section    Body Mechanics, taught by ALPHONSO Barron  at 7/15/2025 11:47 AM.  Learner: Family, Patient  Readiness: Acceptance  Method: Explanation  Response: Needs Reinforcement, Verbalizes Understanding  Comment: See education comment in therapeutic activity section    Home Exercise Program, taught by ALPHONSO Barron at 7/15/2025 11:47 AM.  Learner: Family, Patient  Readiness: Acceptance  Method: Explanation  Response: Needs Reinforcement, Verbalizes Understanding  Comment: See education comment in therapeutic activity section    Mobility Training, taught by ALPHONSO Barron at 7/15/2025 11:47 AM.  Learner: Family, Patient  Readiness: Acceptance  Method: Explanation  Response: Needs Reinforcement, Verbalizes Understanding  Comment: See education comment in therapeutic activity section    Education Comments  No comments found.           [1]   Past Medical History:  Diagnosis Date    Hypertension     Hypothyroidism    [2]   Past Surgical History:  Procedure Laterality Date    APPENDECTOMY      COLONOSCOPY      HIP SURGERY  07/14/2025    Left hip hemiarthroplasty    HYSTERECTOMY

## 2025-07-15 NOTE — DISCHARGE INSTRUCTIONS
Orthopaedic Surgery Discharge Instructions:  Follow-Up Instructions  You will need to be seen in clinic by Dr. Lin in 2 weeks for a post-operative evaluation.    You will need to call and schedule an appointment, unless there is a previous appointment that appears on your discharge instructions.  The direct orthopaedic clinic appointment line phone number is [phone].  Please do not delay in calling to make this appointment.    You should also follow up with your primary care provider in 1-2 weeks.    Activity Restrictions  1) No driving until further instructed by your orthopaedic physician, which will be addressed at your outpatient appointments.    2) No driving or operating heavy machinery while taking narcotic pain medication.    3) Weight bearing status --> weight bearing as tolerated left lower extremity.    4) Posterior hip precautions: do not cross leg over midline, do not bend at waist more than 90 degrees, do not rotate foot or leg inwards.     Wound care instructions:   1) Leave operative dressing in place until 7/22/2025. Then remove and leave incision open to air. Let water run freely over incision when showering, do not scrub. Do not soak in pool or tub.    2) Call if any drainage after 7 days, increased redness/warmth/swelling at incision site, abnormal pain/tenderness of the extremity, abnormal swelling of the extremity that does not respond to elevation, SOB/chest pain.

## 2025-07-15 NOTE — PROGRESS NOTES
07/15/25 1258   Discharge Planning   Expected Discharge Disposition SNF   Does the patient need discharge transport arranged? Yes   [facility] coordination needed? Yes   Has discharge transport been arranged? No   Stroke Family Assessment   Stroke Family Assessment Needed No   Intensity of Service   Intensity of Service 0-30 min     PLAN/BARRIER: PT, OT, snf pre-cert  DISP: [facility]  Patient will need auth started under her [employer] per [facility]  Nisa Ray RN     7/15/25 @ 5733  Requesting auth be submitted.  Trying to reach SNF to see why they want pre-cert initiated under [employer] vs her [employer].  Per SNF, she doesn't have [employer] anymore and has [employer] Medicare.  Nisa Ray RN     7/15/25 @ 7495  Requested DSC complete 7000, so auth may be initiated.  Secure chat to charge nurse to request patient be sitter free.  Nisa Ray RN

## 2025-07-16 VITALS
BODY MASS INDEX: 17.38 KG/M2 | RESPIRATION RATE: 16 BRPM | HEIGHT: 63 IN | HEART RATE: 74 BPM | TEMPERATURE: 99.7 F | DIASTOLIC BLOOD PRESSURE: 64 MMHG | SYSTOLIC BLOOD PRESSURE: 93 MMHG | WEIGHT: 98.11 LBS | OXYGEN SATURATION: 99 %

## 2025-07-16 LAB
ANION GAP SERPL CALC-SCNC: 14 MMOL/L (ref 10–20)
BUN SERPL-MCNC: 17 MG/DL (ref 6–23)
CALCIUM SERPL-MCNC: 9.5 MG/DL (ref 8.6–10.3)
CHLORIDE SERPL-SCNC: 93 MMOL/L (ref 98–107)
CO2 SERPL-SCNC: 25 MMOL/L (ref 21–32)
CREAT SERPL-MCNC: 0.71 MG/DL (ref 0.5–1.05)
EGFRCR SERPLBLD CKD-EPI 2021: 82 ML/MIN/1.73M*2
GLUCOSE SERPL-MCNC: 112 MG/DL (ref 74–99)
POTASSIUM SERPL-SCNC: 3.1 MMOL/L (ref 3.5–5.3)
SODIUM SERPL-SCNC: 129 MMOL/L (ref 136–145)

## 2025-07-16 PROCEDURE — 99239 HOSP IP/OBS DSCHRG MGMT >30: CPT | Performed by: STUDENT IN AN ORGANIZED HEALTH CARE EDUCATION/TRAINING PROGRAM

## 2025-07-16 PROCEDURE — 2500000001 HC RX 250 WO HCPCS SELF ADMINISTERED DRUGS (ALT 637 FOR MEDICARE OP): Performed by: STUDENT IN AN ORGANIZED HEALTH CARE EDUCATION/TRAINING PROGRAM

## 2025-07-16 PROCEDURE — 97535 SELF CARE MNGMENT TRAINING: CPT | Mod: GO,CO

## 2025-07-16 PROCEDURE — 2500000002 HC RX 250 W HCPCS SELF ADMINISTERED DRUGS (ALT 637 FOR MEDICARE OP, ALT 636 FOR OP/ED)

## 2025-07-16 PROCEDURE — 36415 COLL VENOUS BLD VENIPUNCTURE: CPT

## 2025-07-16 PROCEDURE — 97116 GAIT TRAINING THERAPY: CPT | Mod: GP,CQ

## 2025-07-16 PROCEDURE — 80048 BASIC METABOLIC PNL TOTAL CA: CPT

## 2025-07-16 PROCEDURE — 97530 THERAPEUTIC ACTIVITIES: CPT | Mod: GO,CO

## 2025-07-16 PROCEDURE — 2500000001 HC RX 250 WO HCPCS SELF ADMINISTERED DRUGS (ALT 637 FOR MEDICARE OP): Performed by: HOSPITALIST

## 2025-07-16 RX ORDER — ASPIRIN 81 MG/1
81 TABLET ORAL 2 TIMES DAILY
Start: 2025-07-16 | End: 2025-08-11

## 2025-07-16 RX ORDER — ACETAMINOPHEN 325 MG/1
975 TABLET ORAL EVERY 8 HOURS
Start: 2025-07-16 | End: 2025-07-30

## 2025-07-16 RX ADMIN — LEVOTHYROXINE SODIUM 100 MCG: 0.1 TABLET ORAL at 08:51

## 2025-07-16 RX ADMIN — ACETAMINOPHEN 975 MG: 325 TABLET ORAL at 09:20

## 2025-07-16 RX ADMIN — ASPIRIN 81 MG: 81 TABLET, COATED ORAL at 08:51

## 2025-07-16 ASSESSMENT — COGNITIVE AND FUNCTIONAL STATUS - GENERAL
DAILY ACTIVITIY SCORE: 14
MOBILITY SCORE: 12
EATING MEALS: A LITTLE
WALKING IN HOSPITAL ROOM: A LOT
MOBILITY SCORE: 13
TURNING FROM BACK TO SIDE WHILE IN FLAT BAD: A LOT
MOVING FROM LYING ON BACK TO SITTING ON SIDE OF FLAT BED WITH BEDRAILS: A LOT
DRESSING REGULAR LOWER BODY CLOTHING: A LOT
HELP NEEDED FOR BATHING: A LOT
CLIMB 3 TO 5 STEPS WITH RAILING: A LOT
EATING MEALS: A LOT
WALKING IN HOSPITAL ROOM: A LITTLE
DRESSING REGULAR LOWER BODY CLOTHING: A LOT
DRESSING REGULAR UPPER BODY CLOTHING: A LOT
DAILY ACTIVITIY SCORE: 12
PERSONAL GROOMING: A LOT
TURNING FROM BACK TO SIDE WHILE IN FLAT BAD: A LOT
CLIMB 3 TO 5 STEPS WITH RAILING: TOTAL
DRESSING REGULAR UPPER BODY CLOTHING: A LITTLE
TOILETING: TOTAL
STANDING UP FROM CHAIR USING ARMS: A LOT
MOVING TO AND FROM BED TO CHAIR: A LITTLE
MOVING FROM LYING ON BACK TO SITTING ON SIDE OF FLAT BED WITH BEDRAILS: A LOT
PERSONAL GROOMING: A LITTLE
STANDING UP FROM CHAIR USING ARMS: A LOT
MOVING TO AND FROM BED TO CHAIR: A LOT
TOILETING: A LOT
HELP NEEDED FOR BATHING: A LOT

## 2025-07-16 ASSESSMENT — PAIN SCALES - GENERAL
PAINLEVEL_OUTOF10: 0 - NO PAIN
PAINLEVEL_OUTOF10: 0 - NO PAIN
PAINLEVEL_OUTOF10: 3

## 2025-07-16 ASSESSMENT — PAIN - FUNCTIONAL ASSESSMENT
PAIN_FUNCTIONAL_ASSESSMENT: FLACC (FACE, LEGS, ACTIVITY, CRY, CONSOLABILITY)
PAIN_FUNCTIONAL_ASSESSMENT: 0-10

## 2025-07-16 ASSESSMENT — ACTIVITIES OF DAILY LIVING (ADL): HOME_MANAGEMENT_TIME_ENTRY: 10

## 2025-07-16 NOTE — DISCHARGE SUMMARY
[facility] Hospitalist Discharge Summary        Shira Scruggs: 1935MRN: [id number]    ADMIT DATE: 2025DISCHARGE DATE: 2025    PRIMARYCARE PHYSICIAN: Scarlet Newell MD    VISIT STATUS: Inpatient    CODE STATUS: Full Code    CONSULTANTS:  Ortho    HOSPITAL COURSE:    Ground-level mechanical fall resulting in a moderately displaced and angulated left subcapital femoral fracture  - Ortho consulted. Left hip hemiarthroplasty with Dr. Lin on   - WBAT LLE, posterior hip precautions   - at least 4-6 weeks of ASA 81mg BID postoperatively for dvt ppx  - Dressing: Mepilex remove POD7  - PT/OT     Hyponatremia  Hypokalemia  - possibly component of hydrochlorothiazide or SIADH following surgery  - stop hydrochlorothiazide upon DC     Hypothyroidism  - on synthroid     HTN  - monitor BP in outpatient setting      DAY OF DISCHARGE:    Patient Vitals for the past 24 hrs:   BP Temp Temp src Pulse Resp SpO2   25 0708 128/77 36.6 °C (97.9 °F) Temporal -- 15 98 %   25 0419 -- -- -- 74 -- --   07/15/25 2340 (!) 152/94 37.1 °C (98.8 °F) -- 96 17 96 %   07/15/25 1910 143/76 37.1 °C (98.8 °F) Temporal 85 17 97 %   07/15/25 1629 121/74 36.9 °C (98.5 °F) Temporal -- -- 97 %   07/15/25 1200 128/76 36.4 °C (97.6 °F) Oral -- -- 93 %       Average, Min, and Max forlast 24 hours Vitals:  TEMPERATURE: Temp  Av.8 °C (98.3 °F)  Min: 36.4 °C (97.6 °F)  Max: 37.1 °C (98.8 °F)    RESPIRATIONS RANGE: Resp  Av.3  Min: 15  Max: 17    PULSE RANGE: Pulse  Av  Min: 74  Max: 96    BLOOD PRESSURE RANGE: Systolic (24hrs), Av , Min:121 , Max:152   ; Diastolic (24hrs), Av, Min:74, Max:94      PULSE OXIMETRYRANGE: SpO2  Av.2 %  Min: 93 %  Max: 98 %    I/O last 3 completed shifts:  In: 450 (10.1 mL/kg) [P.O.:300; IV Piggyback:150]  Out: - (0 mL/kg)   Weight: 44.5 kg     Physical Exam  Vitals reviewed.   Constitutional:       General: She is not in acute distress.    Neurological:      Mental Status: She  is alert.           Discharge Meds       Your medication list        START taking these medications        Instructions Last Dose Given Next Dose Due   acetaminophen 325 mg tablet  Commonly known as: Tylenol      Take 3 tablets (975 mg) by mouth every 8 hours for 14 days.       aspirin 81 mg EC tablet      Take 1 tablet (81 mg) by mouth 2 times a day for 26 days.              CONTINUE taking these medications        Instructions Last Dose Given Next Dose Due   clonazePAM 0.5 mg tablet  Commonly known as: KlonoPIN      Take 1 tablet (0.5 mg) by mouth once daily at bedtime.       francy.stocking,thigh,reg,med misc           ergocalciferol 1250 mcg (50,000 units) capsule  Commonly known as: Vitamin D-2      Take 1 capsule (1.25 mg) by mouth 1 (one) time per week.       levothyroxine 100 mcg tablet  Commonly known as: Synthroid, Levoxyl      Take 1 tablet (100 mcg) by mouth once daily.       mirtazapine 30 mg tablet  Commonly known as: Remeron      TAKE 1 TABLET (30 MG) BY MOUTH ONCE DAILY AT BEDTIME.              STOP taking these medications      hydroCHLOROthiazide 25 mg tablet  Commonly known as: HYDRODiuril        Pred Forte 1 % ophthalmic suspension  Generic drug: prednisoLONE acetate                  Where to Get Your Medications        Information about where to get these medications is not yet available    Ask your nurse or doctor about these medications  acetaminophen 325 mg tablet  aspirin 81 mg EC tablet         DISPOSITION: [facility] vs SNF    Follow up with PCP Scarlet Newell MD    Outpatient Follow-Up Appointments  Future Appointments   Date Time Provider Department Center   7/24/2025 10:30 AM Elizabeth Guerrero MD [id number] [facility]   11/5/2025 10:40 AM Scarlet Newell MD [id number] [facility]       DISCHARGE TIME: > 30 minutes providing counseling or in coordination of care. Total time on this day of visit includes record and documentation review before and after visit including documentation and  time not explicitly included on EMR time stamp.    Gilma Lemons MD  Hospital Medicine

## 2025-07-16 NOTE — PROGRESS NOTES
Physical Therapy    Physical Therapy Treatment    Patient Name: Shira Curry  MRN: [id number]  Department: [place] [address]  Room: [facility]  Today's Date: 7/16/2025  Time Calculation  Start Time: 0907  Stop Time: 0918  Time Calculation (min): 11 min         Assessment/Plan   PT Assessment  Rehab Prognosis: Good  Barriers to Discharge Home: Caregiver assistance, Cognition needs, Physical needs  Caregiver Assistance: Patient lives alone and/or does not have reliable caregiver assistance  Cognition Needs: 24hr supervision for safety awareness needed, Medication and/or medical management daily assist needed, Recollection or understanding of precautions/restrictions limited, Recollection or understanding of home exercise program limited, Insight of patient limited regarding functional ability/needs, Cognition-related high falls risk  End of Session Communication: Bedside nurse  Assessment Comment: pt able to walk in room, still with noted decreased strength and endurance  End of Session Patient Position: Alarm on, Up in chair  PT Plan  Inpatient/Swing Bed or Outpatient: Inpatient  PT Plan  Treatment/Interventions: Transfer training, Gait training  PT Plan: Ongoing PT  PT Frequency: 3 times per week (During this acute inpatient hospitalization.)  PT Discharge Recommendations: Moderate intensity level of continued care (Based on current functional status and rehab potential, patient is anticipated to tolerate and benefit from 5 or more days per week of skilled rehabilitative therapy after discharge from this acute inpatient hospitalization.)  Equipment Recommended upon Discharge: Other (comment) (TBD)  PT Recommended Transfer Status: Assist x1  PT - OK to Discharge: Yes (per POC)    PT Visit Info:  PT Received On: 07/16/25     General Visit Information:   General  Reason for Referral: Pt is  s/p L hip hemiarthroplasty to repair moderately displaced and angulated left subcapital femoral fracture.  Referred By: Ambrosio Delaney,  MD  Prior to Session Communication: Bedside nurse  Patient Position Received: Up in chair, Alarm on  Preferred Learning Style: auditory, kinesthetic, verbal, visual  General Comment: pt agreeable to tx    Subjective   Precautions:  Precautions  LE Weight Bearing Status: Weight Bearing as Tolerated  Medical Precautions: Fall precautions  Post-Surgical Precautions: Left hip precautions      Objective   Pain:  Pain Assessment  0-10 (Numeric) Pain Score: 0 - No pain  Cognition:  Cognition  Orientation Level: Disoriented to situation, Disoriented to time, Disoriented to place      Ambulation/Gait Training  Ambulation/Gait Training Performed: Yes  Ambulation/Gait Training 1  Surface 1: Level tile  Device 1: Rolling walker  Gait Support Devices: Gait belt  Assistance 1: Minimum assistance, Moderate verbal cues, Moderate tactile cues  Comments/Distance (ft) 1: 18x2 (pt able to take small steps, VC/TC req to stay inside RW to improve safety and posture.  increased time req to complete. x1 standing rest break)  Transfer 1  Technique 1: Sit to stand, Stand to sit  Transfer Device 1: Walker  Transfer Level of Assistance 1: Moderate assistance  Trials/Comments 1: vc/tc for hand placment on solid sitting surface and not RW.    Outcome Measures:  [facility] Basic Mobility  Turning from your back to your side while in a flat bed without using bedrails: A lot  Moving from lying on your back to sitting on the side of a flat bed without using bedrails: A lot  Moving to and from bed to chair (including a wheelchair): A little  Standing up from a chair using your arms (e.g. wheelchair or bedside chair): A lot  To walk in hospital room: A little  Climbing 3-5 steps with railing: Total  Basic Mobility - Total Score: 13    Education Documentation  Precautions, taught by Brennan Cornell PTA at 7/16/2025 11:47 AM.  Learner: Patient  Readiness: Acceptance  Method: Explanation  Response: Needs Reinforcement    Body Mechanics, taught by Brennan SARKAR  LAMAR Cornell at 7/16/2025 11:47 AM.  Learner: Patient  Readiness: Acceptance  Method: Explanation  Response: Needs Reinforcement    Mobility Training, taught by Brennan Cornell PTA at 7/16/2025 11:47 AM.  Learner: Patient  Readiness: Acceptance  Method: Explanation  Response: Needs Reinforcement    Education Comments  No comments found.        OP EDUCATION:       Encounter Problems       Encounter Problems (Active)       Balance       STG - Maintains dynamic standing balance with CGA, WW, unilateral upper extremity support, and stable COM x8 minutes in order to perform functional standing tasks.       Start:  07/15/25    Expected End:  07/29/25       INTERVENTIONS:1. Practice standing with minimal support.2. Educate patient about standing tolerance.3. Educate patient about independence with gait, transfers, and ADL's.4. Educate patient about use of assistive device.5. Educate patient about self-directed care.            Mobility       STG - Patient will ambulate x100' with WW and CGA (Progressing)       Start:  07/15/25    Expected End:  07/29/25            Pt will perform supine and seated TE program up to 20 reps per exercise, 3x/day in order to attain stated functional goals         Start:  07/15/25    Expected End:  07/29/25               PT Transfers       STG - Patient to transfer to and from sit to supine with CGA.       Start:  07/15/25    Expected End:  07/29/25            STG - Patient will transfer sit to and from stand with WW and CGA. (Progressing)       Start:  07/15/25    Expected End:  07/29/25               PT Transfers       STG - Transfer from bed to chair with WW and CGA.       Start:  07/15/25    Expected End:  07/29/25               Safety       Pt will maintain posterior hip precautions and WBAT LLE during all functional mobility, 100% of the time, without cueing.       Start:  07/15/25    Expected End:  07/29/25

## 2025-07-16 NOTE — PROGRESS NOTES
Shira Curry is a 89 y.o. female on day 3 of admission presenting with Closed fracture of neck of left femur.    Plan: continues supportive treatment s/p femur repair. Restraints removed yesterday morning and patient is doing well. AUTH started by [facility] and pending. Facility able to take patient while AUTH is pending. Updated care team. Awaiting response from    Disposition: [facility] started 7/15  Barrier: pain control, AUTH pending  ADOD:  today/tomorrow    1112am   Asked for transport to be arranged and final DC orders to be sent to facility. Will update family and care team once transport time is set.        07/16/25 0810   Discharge Planning   Home or Post Acute Services Post acute facilities (Rehab/SNF/etc)   Type of Post Acute Facility Services Skilled nursing   Expected Discharge Disposition SNF  ([facility]/AUTH started 7/15)         Glo Rodriguez RN

## 2025-07-16 NOTE — CARE PLAN
The patient's goals for the shift include  safety     The clinical goals for the shift include patient to remain safe during shift, free from falls

## 2025-07-16 NOTE — CARE PLAN
The patient's goals for the shift include      The clinical goals for the shift include patient to remain safe during shift, free from falls    Over the shift, the patient did not make progress toward the following goals. Barriers to progression include   Problem: Pain - Adult  Goal: Verbalizes/displays adequate comfort level or baseline comfort level  Outcome: Progressing   . Recommendations to address these barriers include .

## 2025-07-16 NOTE — PROGRESS NOTES
Occupational Therapy    OT Treatment    Patient Name: Shira Curry  MRN: [id number]  Department: [place] [address]  Room: [facility]-  Today's Date: 7/16/2025  Time Calculation  Start Time: 0821  Stop Time: 0845  Time Calculation (min): 24 min        Assessment:  OT Assessment: Pt requiring mod A x1 this date, demonstrating improvement + progression towards POC. Continued education provided on hip precautions with pt expressing understanding. Pt would benefit from continued skilled OT services to improve strength, balance, and functional tolerance to increase independence with ADL tasks  Prognosis: Good  Barriers to Discharge Home: Physical needs, Cognition needs, Caregiver assistance  Caregiver Assistance: Caregiver assistance needed per identified barriers - however, level of patient's required assistance exceeds assistance available at home  Cognition Needs: 24hr supervision for safety awareness needed, Recollection or understanding of precautions/restrictions limited, Insight of patient limited regarding functional ability/needs, Cognition-related high falls risk  Physical Needs: 24hr mobility assistance needed, 24hr ADL assistance needed, High falls risk due to function or environment, Ambulating household distances limited by function/safety  Evaluation/Treatment Tolerance: Patient tolerated treatment well  End of Session Communication: Bedside nurse  End of Session Patient Position: Up in chair, Alarm on  OT Assessment Results: Decreased ADL status, Decreased upper extremity strength, Decreased safe judgment during ADL, Decreased cognition, Decreased endurance, Decreased functional mobility, Decreased IADLs, Decreased trunk control for functional activities  Prognosis: Good  Evaluation/Treatment Tolerance: Patient tolerated treatment well  Plan:  Treatment Interventions: ADL retraining, Functional transfer training, UE strengthening/ROM, Patient/family training, Cognitive reorientation, Endurance training, Equipment  evaluation/education, Compensatory technique education, Neuromuscular reeducation  OT Frequency: 3 times per week (During this acute inpatient hospitalization.)  OT Discharge Recommendations: Moderate intensity level of continued care (Based on current functional status and rehab potential, patient is anticipated to tolerate and benefit from 5 or more days per week of skilled rehabilitative therapy after discharge from this acute inpatient hospitalization.)  Equipment Recommended upon Discharge:  (TBD)  OT Recommended Transfer Status: Assist of 1  OT - OK to Discharge: Yes (OT POC established this date)  Treatment Interventions: ADL retraining, Functional transfer training, UE strengthening/ROM, Patient/family training, Cognitive reorientation, Endurance training, Equipment evaluation/education, Compensatory technique education, Neuromuscular reeducation    Subjective   OT Visit Info:  OT Received On: 07/16/25  General Visit Info:  General  Reason for Referral: Pt is POD #2 s/p L hip hemiarthroplasty to repair moderately displaced and angulated left subcapital femoral fracture.  Referred By: Ambrosio Delaney MD  Past Medical History Relevant to Rehab: Medical History[1],  Prior to Session Communication: Bedside nurse  Patient Position Received: Bed, 3 rail up, Alarm on  General Comment: Cleared for therapy per RN. Pt supine in bed upon arrival and agreeable to tx  Precautions:  Hearing/Visual Limitations: mild [place]  LE Weight Bearing Status: Weight Bearing as Tolerated (LLE)  Medical Precautions: Fall precautions  Post-Surgical Precautions: Left hip precautions (posterior)  Precautions Comment: education provided on hip precautions as pt unable to state, expressing understanding    Pain:  Pain Assessment  Pain Assessment: FLACC (Face, Legs, Activity, Cry, Consolability)  0-10 (Numeric) Pain Score: 3  Pain Type: Acute pain, Surgical pain  Pain Location: Hip  Pain Interventions: Repositioned, Ambulation/increased  activity  Response to Interventions: Resting quietly    Objective    Cognition:  Cognition  Orientation Level: Disoriented to place, Disoriented to time, Disoriented to situation  Cognition Comments: pleasantly confused; pt unaware as to why she is in the hospital and why her hip hurts with re-orientation provided  Safety/Judgement: Exceptions to WFL  Insight: Moderate  Processing Speed: Delayed  Coordination:  Movements are Fluid and Coordinated: No  Upper Body Coordination: delayed rate + accuracy of BUE  movements  Activities of Daily Living: Feeding  Feeding Level of Assistance: Setup  Feeding Where Assessed: Chair  Feeding Comments: s/u for feeding task at end of session with containers opened    Grooming  Grooming Level of Assistance: Setup, Close supervision, Minimum assistance  Grooming Where Assessed: Chair  Grooming Comments: s/u for face washing task with VC for task efficiency. Assist to comb + style hair d/t knotting with pt able to initiate task.    UE Dressing  UE Dressing Level of Assistance: Minimum assistance  UE Dressing Where Assessed: Chair  UE Dressing Comments: assist to don/doff gown    Bed Mobility/Transfers: Bed Mobility  Bed Mobility: Yes  Bed Mobility 1  Bed Mobility 1: Supine to sitting  Level of Assistance 1: Moderate assistance, Moderate verbal cues  Bed Mobility Comments 1: assist for BLE advancement off EOB and for trunk elevation with cues for use of bed rail for UE support with increased time + effort to complete supine>sit with pt expressing mild dizziness once seated, subsiding after short period.    Transfers  Transfer: Yes  Transfer 1  Technique 1: Sit to stand, Stand to sit  Transfer Device 1: Gait belt, Walker  Transfer Level of Assistance 1: Moderate assistance, Moderate verbal cues  Trials/Comments 1: assist for trunk elevation with cues for proper hand placement and eccentric lowering to chair    Functional Mobility:  Functional Mobility  Functional Mobility Performed:  Yes  Functional Mobility 1  Device 1: Rolling walker  Assistance 1: Moderate assistance, Moderate verbal cues  Comments 1: Tolerated taking steps to chair at FWW with VC for step/walker sequencing with increased time + effort required for task completion, demonstrating fair- balance    Standing Balance:  Dynamic Standing Balance  Dynamic Standing-Level of Assistance: Moderate assistance  Dynamic Standing-Comments: fair- balance during functional mobility task    Outcome Measures:[facility] Daily Activity  Putting on and taking off regular lower body clothing: A lot  Bathing (including washing, rinsing, drying): A lot  Putting on and taking off regular upper body clothing: A little  Toileting, which includes using toilet, bedpan or urinal: Total  Taking care of personal grooming such as brushing teeth: A little  Eating Meals: A little  Daily Activity - Total Score: 14    Education Documentation  Handouts, taught by MAYTE Chowdhury at 7/16/2025  9:30 AM.  Learner: Patient  Readiness: Acceptance  Method: Explanation  Response: Verbalizes Understanding, Needs Reinforcement    Body Mechanics, taught by MAYTE Chowdhury at 7/16/2025  9:30 AM.  Learner: Patient  Readiness: Acceptance  Method: Explanation  Response: Verbalizes Understanding, Needs Reinforcement    Precautions, taught by MAYTE Chowdhury at 7/16/2025  9:30 AM.  Learner: Patient  Readiness: Acceptance  Method: Explanation  Response: Verbalizes Understanding, Needs Reinforcement    ADL Training, taught by MAYTE Chowdhury at 7/16/2025  9:30 AM.  Learner: Patient  Readiness: Acceptance  Method: Explanation  Response: Verbalizes Understanding, Needs Reinforcement    Education Comments  No comments found.      Problem: ADLs  Goal: Patient will perform UB and LB bathing with minimal assist  level of assistance and grab bars and shower chair and long handled sponge.  Outcome: Progressing  Goal: Patient with complete upper  body dressing with set up A level of assistance donning and doffing all UE clothes with PRN adaptive equipment.  Outcome: Progressing  Goal: Patient with complete lower body dressing with minimal assist  level of assistance donning and doffing all LE clothes  with PRN adaptive equipment.  Outcome: Progressing  Goal: Patient will complete daily grooming tasks with set-up level of assistance and PRN adaptive equipment.  Outcome: Progressing  Goal: Patient will complete toileting including hygiene clothing management/hygiene with minimal assist  level of assistance and bedside commode.  Outcome: Progressing     Problem: TRANSFERS  Goal: Patient will perform bed mobility contact guard assist level of assistance and bed rails in order to improve safety and independence with mobility  Outcome: Progressing  Goal: Patient will complete functional transfers with least restrictive device with contact guard assist level of assistance.  Outcome: Progressing     Problem: MOBILITY  Goal: Patient will perform Functional mobility x Household distances/Community Distances with contact guard assist level of assistance and least restrictive device in order to improve safety and functional mobility.  Outcome: Progressing         [1]   Past Medical History:  Diagnosis Date    Hypertension     Hypothyroidism

## 2025-07-16 NOTE — NURSING NOTE
Interdisciplinary team present: NP, PT, NM, CC, SW, Orthopedic Coordinator, and bedside RN.  Pain - controlled  Nausea - none  Discharge barrier - need auth  Discharge plan - SNF ([facility])  Discharge date/time - today or tomorrow

## 2025-07-16 NOTE — PROGRESS NOTES
Physical Therapy                 Therapy Communication Note    Patient Name: Shira Curry  MRN: [id number]  Department: [facility]   Room: [facility]  Today's Date: 7/16/2025     Discipline: Physical Therapy    Missed Visit: PT Missed Visit: Yes     Missed Visit Reason: Missed Visit Reason:  (pt eating breakfast at this time)    Missed Time: Attempt    Comment:

## 2025-07-16 NOTE — DOCUMENTATION CLARIFICATION NOTE
"    PATIENT:               KELLEN KUHN  ACCT #:                  [id number]  MRN:                       [id number]  :                       1935  ADMIT DATE:       2025 8:15 PM  DISCH DATE:        2025 12:16 PM  RESPONDING PROVIDER #:        [id number]          PROVIDER RESPONSE TEXT:    Metabolic encephalopathy 2/2 surgery and hyponatremia    CDI QUERY TEXT:    Clarification    Instruction:    Based on your assessment of the patient and the clinical information, please provide the requested documentation by clicking on the appropriate radio button and enter any additional information if prompted.    Question: Please further clarify the most likely etiology of the altered mental status as    When answering this query, please exercise your independent professional judgment. The fact that a question is being asked, does not imply that any particular answer is desired or expected.    The patient's clinical indicators include:  Clinical Information: 89 y/o female presented with left hip pain s/p fall.    Clinical Indicators:  - 7/15 Na 130, 128, 130, 129    H&P: \"She is alert. Mental status is at baseline\"    7/15 Ortho progress note: \"Sundowning overnight requiring bedside sitter.\" \"arousable, NAD, intermittently confused\"    Nursing notes:  ,  \"disoriented to time\"  7/15 \"disoriented to time, place, situation, confused\"    Treatment: sitter at bedside    Risk Factors: age, surgery, hyponatremia  Options provided:  -- Metabolic encephalopathy 2/2 surgery and hyponatremia  -- Other - I will add my own diagnosis  -- Refer to Clinical Documentation Reviewer    Query created by: Rachel Marin on 7/15/2025 12:28 PM      Electronically signed by:  ELENA VELASQUEZ MD 2025 2:31 PM          "

## 2025-07-17 LAB
HOLD SPECIMEN: NORMAL
HOLD SPECIMEN: NORMAL

## 2025-07-22 LAB
ATRIAL RATE: 66 BPM
P AXIS: 62 DEGREES
P OFFSET: 143 MS
P ONSET: 105 MS
PR INTERVAL: 226 MS
Q ONSET: 218 MS
QRS COUNT: 11 BEATS
QRS DURATION: 82 MS
QT INTERVAL: 408 MS
QTC CALCULATION(BAZETT): 427 MS
QTC FREDERICIA: 421 MS
R AXIS: 25 DEGREES
T AXIS: 26 DEGREES
T OFFSET: 422 MS
VENTRICULAR RATE: 66 BPM

## 2025-07-24 ENCOUNTER — APPOINTMENT (OUTPATIENT)
Dept: BEHAVIORAL HEALTH | Facility: CLINIC | Age: 89
End: 2025-07-24
Payer: MEDICARE

## 2025-07-24 DIAGNOSIS — F34.1 PERSISTENT DEPRESSIVE DISORDER: ICD-10-CM

## 2025-07-24 PROCEDURE — 1036F TOBACCO NON-USER: CPT | Performed by: PSYCHIATRY & NEUROLOGY

## 2025-07-24 PROCEDURE — 1111F DSCHRG MED/CURRENT MED MERGE: CPT | Performed by: PSYCHIATRY & NEUROLOGY

## 2025-07-24 PROCEDURE — 99213 OFFICE O/P EST LOW 20 MIN: CPT | Performed by: PSYCHIATRY & NEUROLOGY

## 2025-07-24 NOTE — PATIENT INSTRUCTIONS
Plan:   - Continue remeron 30mg at bedtime.   - Continue clonazepam 0.5mg at bedtime. (She has tried lowering clonazepam but could not tolerate.)  - Encouraged healthy lifestyle.   - Follow up in about 3 months. Call [phone] to schedule.   - Contact via [employer] or call sooner ([phone]) in case of any questions or concerns.  - Call [phone] for mental health crisis or suicidal thoughts, or call [phone] or go to the nearest emergency room for emergencies.

## 2025-07-24 NOTE — PROGRESS NOTES
"Outpatient Psychiatry Follow up Progress Note:     Ms. Shira Curry is an 89-year old woman with a history of depression, hypothyroidism, HTN, IBS. Follow up done virtually today. Patient was unable to do video visit.   Her daughter Macarena was also present.     Virtual or Telephone Consent    An interactive audio and video telecommunication system which permits real time communications between the patient (at the originating site) and provider (at the distant site) was utilized to provide this telehealth service.   Verbal consent was requested and obtained from Shira Curry on this date, 07/24/25 for a telehealth visit and the patient's location was confirmed at the time of the visit.    Reason for Visit:    Follow up for depression    Subjective     She says she is \"okay.\"   Denies feeling depressed.   She says she does not sleep that well.   She says her appetite is okay.   No death wishes, suicidal thoughts, intent or plans.   No HI.  Denies AVH, paranoia or delusions.     She says she gets worried sometimes but denies excessive worry or anxiety.    She says she forgets some things. Macarena says that after her fall, she was disoriented because she is in rehab unit. She was doing okay when she was in assisted living.   She does not drive.   Her daughters manage her finances.     She says she is still having problems with her hip - she fell last Sunday and went to the ER and had surgery. She is doing rehab now that is in assisted living ([facility]).     She says the staff comes to administer medications.   She uses a shower chair.     Has six daughters; has two daughters (Summer, Macarena) in [place] who help.      Current medications includes:   Mirtazapine 30mg at bedtime  Clonazepam 0.5mg at bedtime.    Current Medications:    Current Outpatient Medications:     acetaminophen (Tylenol) 325 mg tablet, Take 3 tablets (975 mg) by mouth every 8 hours for 14 days., Disp: , Rfl:     aspirin 81 mg EC tablet, Take 1 " tablet (81 mg) by mouth 2 times a day for 26 days., Disp: , Rfl:     clonazePAM (KlonoPIN) 0.5 mg tablet, Take 1 tablet (0.5 mg) by mouth once daily at bedtime., Disp: 30 tablet, Rfl: 2    francy.stocking,thigh,reg,med misc, 1 Units see administration instructions., Disp: , Rfl:     ergocalciferol (Vitamin D-2) 1250 mcg (50,000 units) capsule, Take 1 capsule (1.25 mg) by mouth 1 (one) time per week. (Patient taking differently: Take 1 capsule (1.25 mg) by mouth 1 (one) time per week. Thursdays), Disp: 12 capsule, Rfl: 0    levothyroxine (Synthroid, Levoxyl) 100 mcg tablet, Take 1 tablet (100 mcg) by mouth once daily., Disp: 90 tablet, Rfl: 1    mirtazapine (Remeron) 30 mg tablet, TAKE 1 TABLET (30 MG) BY MOUTH ONCE DAILY AT BEDTIME., Disp: 90 tablet, Rfl: 1  Medical History:  Past Medical History:   Diagnosis Date    Hypertension     Hypothyroidism      Surgical History:  Past Surgical History:   Procedure Laterality Date    APPENDECTOMY      COLONOSCOPY      HIP SURGERY  07/14/2025    Left hip hemiarthroplasty    HYSTERECTOMY       Family History:  Family History   Problem Relation Name Age of Onset    Hypertension Mother      Breast cancer Mother       Social History:  Social History     Socioeconomic History    Marital status:      Spouse name: Not on file    Number of children: Not on file    Years of education: Not on file    Highest education level: Not on file   Occupational History    Not on file   Tobacco Use    Smoking status: Never    Smokeless tobacco: Never   Substance and Sexual Activity    Alcohol use: Not Currently    Drug use: Never    Sexual activity: Not Currently     Partners: Male   Other Topics Concern    Not on file   Social History Narrative    Not on file     Social Drivers of Health     Financial Resource Strain: Low Risk  (7/14/2025)    Overall Financial Resource Strain (CARDIA)     Difficulty of Paying Living Expenses: Not hard at all   Food Insecurity: No Food Insecurity  "(7/14/2025)    Hunger Vital Sign     Worried About Running Out of Food in the Last Year: Never true     Ran Out of Food in the Last Year: Never true   Transportation Needs: No Transportation Needs (7/14/2025)    PRAPARE - Transportation     Lack of Transportation (Medical): No     Lack of Transportation (Non-Medical): No   Physical Activity: Not on file   Stress: Not on file   Social Connections: Not on file   Intimate Partner Violence: Not At Risk (7/14/2025)    Humiliation, Afraid, Rape, and Kick questionnaire     Fear of Current or Ex-Partner: No     Emotionally Abused: No     Physically Abused: No     Sexually Abused: No   Housing Stability: Low Risk  (7/14/2025)    Housing Stability Vital Sign     Unable to Pay for Housing in the Last Year: No     Number of Times Moved in the Last Year: 0     Homeless in the Last Year: No         Record Review: brief       Psychiatric Review Of Systems:  As above.      Medical Review Of Systems:  Pertinent items are noted in HPI.      Objective   Mental Status Exam:  Appearance: Fair grooming  Behavior/Attitude: Limited engagement today.   Speech: Regular in rate, tone, and volume. No pressure.  Mood: \"okay.\"  Affect: slightly constricted; in no apparent distress.   Thought process: Fairly goal-directed. Needed some prompting by daughter.   Thought content: No paranoia, delusion or ideas of reference elicited. No hallucinations in auditory, visual or other sensory modalities.   Suicidal ideation: denied.  Homicidal ideation: denied.   Insight: Partial insight into need for assistance.   Judgment: Questionable.  Recent and remote memory: fair recall of major recent and remote autobiographical memories. Was not able to provide details and needed prompting by daughter.   Attention/concentration: intact during visit  Language: No aphasia or paraphasic errors during conversation   Fund of knowledge: Average    Orientation: oriented to date, month, year but not day. Not able to name " to name facility; says [facility] is [facility].   Not able to state current president (Says Griffin Arellano) but able to name current president with prompting.     Vitals:  There were no vitals filed for this visit.    Assessment/Plan   Diagnosis:   Major depressive disorder, in full remission    Assessment:   Ms. Shira Curry is an 89-year old woman with a history of depression, hypothyroidism, HTN, IBS. Follow up done virtually today.      7/24/25: Mood is stable. She recently had a fall requiring hip surgery and is currently in rehab. She is in rehab currently.     Treatment Plan/Recommendations:   - Continue remeron 30mg at bedtime.   - Continue clonazepam 0.5mg at bedtime. (She has tried lowering clonazepam but could not tolerate.)  - Encouraged healthy lifestyle.   - Follow up in about 3 months.     Elizabeth Guerrero MD.

## 2025-08-08 ENCOUNTER — OFFICE VISIT (OUTPATIENT)
Dept: ORTHOPEDIC SURGERY | Facility: CLINIC | Age: 89
End: 2025-08-08
Payer: MEDICARE

## 2025-08-08 ENCOUNTER — HOSPITAL ENCOUNTER (OUTPATIENT)
Dept: RADIOLOGY | Facility: CLINIC | Age: 89
Discharge: HOME | End: 2025-08-08
Payer: MEDICARE

## 2025-08-08 DIAGNOSIS — M25.552 LEFT HIP PAIN: ICD-10-CM

## 2025-08-08 DIAGNOSIS — S72.002S CLOSED FRACTURE OF NECK OF LEFT FEMUR, SEQUELA: Primary | ICD-10-CM

## 2025-08-08 PROCEDURE — 99024 POSTOP FOLLOW-UP VISIT: CPT | Performed by: ORTHOPAEDIC SURGERY

## 2025-08-08 PROCEDURE — 99212 OFFICE O/P EST SF 10 MIN: CPT

## 2025-08-08 PROCEDURE — 73502 X-RAY EXAM HIP UNI 2-3 VIEWS: CPT | Mod: LT

## 2025-08-08 PROCEDURE — 1036F TOBACCO NON-USER: CPT | Performed by: ORTHOPAEDIC SURGERY

## 2025-08-08 PROCEDURE — 73502 X-RAY EXAM HIP UNI 2-3 VIEWS: CPT | Mod: LEFT SIDE | Performed by: RADIOLOGY

## 2025-08-08 PROCEDURE — 1159F MED LIST DOCD IN RCRD: CPT | Performed by: ORTHOPAEDIC SURGERY

## 2025-08-08 PROCEDURE — 1111F DSCHRG MED/CURRENT MED MERGE: CPT | Performed by: ORTHOPAEDIC SURGERY

## 2025-08-08 NOTE — PROGRESS NOTES
S: Pain well controlled.  Some achiness in both legs.  Still in rehab.    O: Incisions healing nicely. Good alignment.  No pain with hip logroll bilateral.  No pain with resisted hip flexion bilateral.    XR: I personally reviewed the following radiographic exams: AP pelvis and left hip shows well-fixed well aligned left hip hemiarthroplasty.    A: S/P left hip hemiarthroplasty.    P: Note for physical therapy to continue weightbearing as tolerated with hip precautions for a few more weeks.  Follow-up as needed.

## 2025-08-21 ENCOUNTER — APPOINTMENT (OUTPATIENT)
Dept: ORTHOPEDIC SURGERY | Facility: CLINIC | Age: 89
End: 2025-08-21
Payer: COMMERCIAL

## (undated) DEVICE — SUTURE, VICRYL, 0, 36 IN, CT-1, UNDYED

## (undated) DEVICE — SUTURE, VICRYL PLUS, 2-0, 27IN, PSL, UND, BRAIDED

## (undated) DEVICE — BLADE, SAW PFC DUAL CUT 25 X 90

## (undated) DEVICE — SUTURE, MONOCRYL, 4-0, 18 IN, PS2, UNDYED

## (undated) DEVICE — INTERPULSE HANDPIECE SET W/ 10FT SUCTION TUBING

## (undated) DEVICE — HIGH FLOW TIP FOR INTERPULSE HANDPIECE SET

## (undated) DEVICE — TOWEL, SURGICAL, NEURO, O/R, 16 X 26, BLUE, STERILE

## (undated) DEVICE — HOOD, SURGICAL, FLYTE SURGICOOL

## (undated) DEVICE — RETRIEVER, SUTURE, HEWSON

## (undated) DEVICE — ADHESIVE, SKIN, MASTISOL, 2/3 CC VIAL

## (undated) DEVICE — GLOVE, SURGICAL, PROTEXIS PI BLUE W/NEUTHERA, 8.0, PF, LF

## (undated) DEVICE — GLOVE, SURGICAL, PROTEXIS PI ORTHO, 8.0, PF, LF

## (undated) DEVICE — DRESSING, GAUZE, PETROLATUM, PATCH, XEROFORM, 1 X 8 IN, STERILE

## (undated) DEVICE — DRESSING, ADHESIVE, ISLAND, TELFA, 4 X 10 IN

## (undated) DEVICE — SUTURE, VICRYL, 1, 27 IN, OS-6, UNDYED

## (undated) DEVICE — Device

## (undated) DEVICE — ADHESIVE, DERMABOND, PRINEO, 12 X 9, STERILE